# Patient Record
Sex: MALE | Race: WHITE | NOT HISPANIC OR LATINO | Employment: OTHER | URBAN - METROPOLITAN AREA
[De-identification: names, ages, dates, MRNs, and addresses within clinical notes are randomized per-mention and may not be internally consistent; named-entity substitution may affect disease eponyms.]

---

## 2024-08-19 ENCOUNTER — SPECIALTY PHARMACY (OUTPATIENT)
Age: 69
End: 2024-08-19
Payer: MEDICARE

## 2024-08-19 PROBLEM — L40.50 PSORIATIC ARTHRITIS: Status: ACTIVE | Noted: 2024-08-19

## 2024-08-20 PROBLEM — Z79.899 IMMUNOSUPPRESSION DUE TO DRUG THERAPY: Status: ACTIVE | Noted: 2024-08-20

## 2024-08-20 PROBLEM — L40.9 PSORIASIS: Status: ACTIVE | Noted: 2024-08-20

## 2024-08-20 PROBLEM — T78.1XXA ALLERGIC REACTION TO ALPHA-GAL: Status: ACTIVE | Noted: 2024-08-20

## 2024-08-20 PROBLEM — D84.821 IMMUNOSUPPRESSION DUE TO DRUG THERAPY: Status: ACTIVE | Noted: 2024-08-20

## 2024-08-20 PROBLEM — M19.93 SECONDARY OSTEOARTHRITIS: Status: ACTIVE | Noted: 2024-08-20

## 2024-08-20 NOTE — ASSESSMENT & PLAN NOTE
ONSET 1991. MTX CAUSED NAUSEA. ARAVA [id number] (INEFFECTIVE). ENBREL 2002..    He continues to do well.   No significant pain or swelling.   Tender joint count is 0 , swollen joint count is 0 , physician global is 1, visual analog pain scale is 4, pt global is 3.  CDAI is 4-low disease activity. Prognosis is good.    Continue Enbrel and q 4 month f/u and lab.

## 2024-08-20 NOTE — PROGRESS NOTES
Office Follow Up      Date: 08/21/2024   Patient Name: Ashutosh Servin  MRN: [id number]  YOB: 1955    Referring Physician: No ref. provider found     Chief Complaint: Psoriatic arthritis      History of Present Illness: Ashutosh Servin is a 69 y.o. male who is here today for follow up on psoriatic arthritis and gout.  He has done well.  No significant pain or swelling.   No gout flares in the interim.    PsA is doing well. No infections or problems with injections.   Had acupuncture and no longer having alpha gal reactions   Pain scale: 4/10. He has no EMS. The problem has not changed. The following symptoms are not reported:  pain, stiffness, swelling and functional limitation. The patient assesses the interval disease activity as: stable. The patient's assessment of treatment is: helping greatly. The patient is not experiencing side effects. Pertinent negatives include fever, infection, fatigue, AM stiffness, inactivity gelling, change in vision, sicca complaints, mouth sores/lesions, skin lesion(s), chest pain, changing cough, edema, joint swelling and abdominal pain.       Subjective   Review of Systems: Review of Systems   Constitutional:  Negative for chills, fatigue, fever and unexpected weight loss.   HENT:  Positive for tinnitus. Negative for dental problem, mouth sores, sinus pressure and swollen glands.    Eyes:  Negative for photophobia, pain and redness.   Respiratory:  Negative for apnea, cough, chest tightness and shortness of breath.    Cardiovascular:  Negative for chest pain and leg swelling.   Gastrointestinal:  Positive for GERD. Negative for abdominal pain, diarrhea and nausea.   Genitourinary:  Negative for dysuria and hematuria.   Skin:  Negative for dry skin, rash, skin lesions and bruise.   Neurological:  Negative for dizziness, seizures, syncope, weakness, headache and memory problem.   Hematological:  Negative for adenopathy. Does not bruise/bleed easily.  "  Psychiatric/Behavioral:  Negative for sleep disturbance, suicidal ideas, depressed mood and stress. The patient is not nervous/anxious.    All other systems reviewed and are negative.       Medications:   Current Outpatient Medications:     etanercept (Enbrel) 50 MG/ML solution prefilled syringe injection, Inject 1 mL under the skin into the appropriate area as directed 1 (One) Time Per Week. INJECT 50 ML WEEKLY AS DIRECTED, Disp: 4 each, Rfl: 4    mometasone (ELOCON) 0.1 % ointment, Apply 1 Application topically to the appropriate area as directed Daily., Disp: , Rfl:     multivitamin (MULTIVITAMIN PO), Take 1 tablet by mouth Daily., Disp: , Rfl:     Allergies:   Allergies   Allergen Reactions    Penicillins Provider Review Needed       I have reviewed and updated the patient's chief complaint, history of present illness, review of systems, past medical history, surgical history, family history, social history, medications and allergy list as appropriate.     Objective    Vital Signs:   Vitals:    08/21/24 0920   BP: 116/82   Pulse: 58   Temp: 98.2 °F (36.8 °C)   Weight: 105 kg (231 lb 14.4 oz)   Height: 182.9 cm (72\")   PainSc:   4   PainLoc: Hand  Comment: Bilateral/feet     Body mass index is 31.45 kg/m².    Physical Exam:  GENERAL: The patient is well developed and well nourished.  Cooperative and oriented times three.  Affect is normal.  Hydration appears normal.    HEENT: Normocephalic and atraumatic.  No notable alopecia.  No facial rash.  Lids and conjunctiva are normal.  Pupils are equal and sclera are clear.  I see no oral or nasal ulcers.  Lips, teeth, and gums are within normal limits.  Oropharynx is clear.    NECK: Neck is supple without adenopathy, masses, or thyromegaly.    LUNGS: Effort is normal.  Lungs are clear without rales or rhonchi.    CARDIOVASCULAR: Normal S1, S2.  No murmurs are heard.    ABDOMEN: There are no masses.  The abdomen is non-tender.  I feel no hepatosplenomegaly.  "   EXTREMITIES: There is no edema.    I see no cyanosis or clubbing.    SKIN: Inspection and palpation are normal.  No rashes are present. Moderate psoriatic lesions on elbows.  NEUROLOGIC: Gait is normal.  MUSCULOSKELETAL: Complete joint exam was performed.  There is full range of motion of the shoulders, elbows, and wrists without notable deformities, soft tissue swelling, synovitis, or atrophy. Severe, nearly mutilans deformities of fingers. Tender on lateral epicondyle. Hips have good flexion and internal and external rotation.  Knees have no palpable effusions.  There is full extension and full flexion of the knees without pain.  Ankles have no soft tissue swelling, synovitis, or major deformities.    BACK: Straight without notable scoliosis.  Joint Exam 08/21/2024     The following joints were examined and normal:   Left Glenohumeral, Right Glenohumeral, Left Elbow, Right Elbow, Left Wrist, Right Wrist, Left MCP 1, Right MCP 1, Left MCP 2, Right MCP 2, Left MCP 3, Right MCP 3, Left MCP 4, Right MCP 4, Left MCP 5, Right MCP 5, Left IP (thumb), Right IP (thumb), Left PIP 2 (finger), Right PIP 2 (finger), Left PIP 3 (finger), Right PIP 3 (finger), Left PIP 4 (finger), Right PIP 4 (finger), Left PIP 5 (finger), Right PIP 5 (finger), Left Knee, Right Knee       Patient Global: 30 / 100  Provider Global: 0 / 100    Assessment / Plan      Assessment & Plan  Psoriatic arthritis  ONSET 1991. MTX CAUSED NAUSEA. ARAVA [phone] (INEFFECTIVE). ENBREL 2002..    He continues to do well.   No significant pain or swelling.   Tender joint count is 0 , swollen joint count is 0 , physician global is 1, visual analog pain scale is 4, pt global is 3.  CDAI is 4-low disease activity. Prognosis is good.    Continue Enbrel and q 4 month f/u and lab.  Psoriasis  Stable with minimal lesions.  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  Allergic reaction to alpha-gal  Better since  acupuncture treatment.   Immunosuppression due to drug therapy  Enbrel.  No infections in the interim.  Idiopathic chronic gout of multiple sites without tophus  He declines treatment.  He has done well with diet change. No flares in the interim.     Orders Placed This Encounter   Procedures    Comprehensive Metabolic Panel    CBC Auto Differential    C-reactive Protein    Sedimentation Rate    Uric Acid     New Medications Ordered This Visit   Medications    etanercept (Enbrel) 50 MG/ML solution prefilled syringe injection     Sig: Inject 1 mL under the skin into the appropriate area as directed 1 (One) Time Per Week. INJECT 50 ML WEEKLY AS DIRECTED     Dispense:  4 each     Refill:  4          Follow Up:   Return in about 4 months (around 12/21/2024).        Jeffrey William MD  [facility] [place]

## 2024-08-21 ENCOUNTER — OFFICE VISIT (OUTPATIENT)
Age: 69
End: 2024-08-21
Payer: MEDICARE

## 2024-08-21 ENCOUNTER — LAB (OUTPATIENT)
Facility: HOSPITAL | Age: 69
End: 2024-08-21
Payer: MEDICARE

## 2024-08-21 VITALS
HEIGHT: 72 IN | BODY MASS INDEX: 31.41 KG/M2 | SYSTOLIC BLOOD PRESSURE: 116 MMHG | TEMPERATURE: 98.2 F | WEIGHT: 231.9 LBS | DIASTOLIC BLOOD PRESSURE: 82 MMHG | HEART RATE: 58 BPM

## 2024-08-21 DIAGNOSIS — D84.821 IMMUNOSUPPRESSION DUE TO DRUG THERAPY: ICD-10-CM

## 2024-08-21 DIAGNOSIS — Z79.899 IMMUNOSUPPRESSION DUE TO DRUG THERAPY: ICD-10-CM

## 2024-08-21 DIAGNOSIS — T78.1XXA ALLERGIC REACTION TO ALPHA-GAL: ICD-10-CM

## 2024-08-21 DIAGNOSIS — L40.9 PSORIASIS: ICD-10-CM

## 2024-08-21 DIAGNOSIS — M1A.09X0 IDIOPATHIC CHRONIC GOUT OF MULTIPLE SITES WITHOUT TOPHUS: ICD-10-CM

## 2024-08-21 DIAGNOSIS — L40.50 PSORIATIC ARTHRITIS: Primary | ICD-10-CM

## 2024-08-21 DIAGNOSIS — L40.50 PSORIATIC ARTHRITIS: ICD-10-CM

## 2024-08-21 DIAGNOSIS — M19.93 SECONDARY OSTEOARTHRITIS: ICD-10-CM

## 2024-08-21 LAB
ALBUMIN SERPL-MCNC: 4.5 G/DL (ref 3.5–5.2)
ALBUMIN/GLOB SERPL: 1.4 G/DL
ALP SERPL-CCNC: 67 U/L (ref 39–117)
ALT SERPL W P-5'-P-CCNC: 37 U/L (ref 1–41)
ANION GAP SERPL CALCULATED.3IONS-SCNC: 12.1 MMOL/L (ref 5–15)
AST SERPL-CCNC: 41 U/L (ref 1–40)
BILIRUB SERPL-MCNC: 1 MG/DL (ref 0–1.2)
BUN SERPL-MCNC: 12 MG/DL (ref 8–23)
BUN/CREAT SERPL: 7.6 (ref 7–25)
CALCIUM SPEC-SCNC: 10 MG/DL (ref 8.6–10.5)
CHLORIDE SERPL-SCNC: 104 MMOL/L (ref 98–107)
CO2 SERPL-SCNC: 26.9 MMOL/L (ref 22–29)
CREAT SERPL-MCNC: 1.58 MG/DL (ref 0.76–1.27)
CRP SERPL-MCNC: <0.3 MG/DL (ref 0–0.5)
EGFRCR SERPLBLD CKD-EPI 2021: 47.4 ML/MIN/1.73
GLOBULIN UR ELPH-MCNC: 3.2 GM/DL
GLUCOSE SERPL-MCNC: 82 MG/DL (ref 65–99)
POTASSIUM SERPL-SCNC: 4.6 MMOL/L (ref 3.5–5.2)
PROT SERPL-MCNC: 7.7 G/DL (ref 6–8.5)
SODIUM SERPL-SCNC: 143 MMOL/L (ref 136–145)
URATE SERPL-MCNC: 7.1 MG/DL (ref 3.4–7)

## 2024-08-21 PROCEDURE — 85652 RBC SED RATE AUTOMATED: CPT

## 2024-08-21 PROCEDURE — 1160F RVW MEDS BY RX/DR IN RCRD: CPT | Performed by: INTERNAL MEDICINE

## 2024-08-21 PROCEDURE — 99214 OFFICE O/P EST MOD 30 MIN: CPT | Performed by: INTERNAL MEDICINE

## 2024-08-21 PROCEDURE — 84550 ASSAY OF BLOOD/URIC ACID: CPT

## 2024-08-21 PROCEDURE — 1159F MED LIST DOCD IN RCRD: CPT | Performed by: INTERNAL MEDICINE

## 2024-08-21 PROCEDURE — 86140 C-REACTIVE PROTEIN: CPT

## 2024-08-21 PROCEDURE — 85025 COMPLETE CBC W/AUTO DIFF WBC: CPT

## 2024-08-21 PROCEDURE — 80053 COMPREHEN METABOLIC PANEL: CPT

## 2024-08-21 PROCEDURE — 36415 COLL VENOUS BLD VENIPUNCTURE: CPT

## 2024-08-21 RX ORDER — ETANERCEPT 50 MG/ML
50 SOLUTION SUBCUTANEOUS WEEKLY
Qty: 4 EACH | Refills: 4 | Status: SHIPPED | OUTPATIENT
Start: 2024-08-21

## 2024-08-22 LAB
BASOPHILS # BLD AUTO: 0.05 10*3/MM3 (ref 0–0.2)
BASOPHILS NFR BLD AUTO: 0.6 % (ref 0–1.5)
DEPRECATED RDW RBC AUTO: 42.3 FL (ref 37–54)
EOSINOPHIL # BLD AUTO: 0.16 10*3/MM3 (ref 0–0.4)
EOSINOPHIL NFR BLD AUTO: 2 % (ref 0.3–6.2)
ERYTHROCYTE [DISTWIDTH] IN BLOOD BY AUTOMATED COUNT: 12.5 % (ref 12.3–15.4)
ERYTHROCYTE [SEDIMENTATION RATE] IN BLOOD: 9 MM/HR (ref 0–20)
HCT VFR BLD AUTO: 54 % (ref 37.5–51)
HGB BLD-MCNC: 18.5 G/DL (ref 13–17.7)
IMM GRANULOCYTES # BLD AUTO: 0.02 10*3/MM3 (ref 0–0.05)
IMM GRANULOCYTES NFR BLD AUTO: 0.3 % (ref 0–0.5)
LYMPHOCYTES # BLD AUTO: 2.41 10*3/MM3 (ref 0.7–3.1)
LYMPHOCYTES NFR BLD AUTO: 30.1 % (ref 19.6–45.3)
MCH RBC QN AUTO: 32 PG (ref 26.6–33)
MCHC RBC AUTO-ENTMCNC: 34.3 G/DL (ref 31.5–35.7)
MCV RBC AUTO: 93.3 FL (ref 79–97)
MONOCYTES # BLD AUTO: 0.66 10*3/MM3 (ref 0.1–0.9)
MONOCYTES NFR BLD AUTO: 8.3 % (ref 5–12)
NEUTROPHILS NFR BLD AUTO: 4.7 10*3/MM3 (ref 1.7–7)
NEUTROPHILS NFR BLD AUTO: 58.7 % (ref 42.7–76)
NRBC BLD AUTO-RTO: 0 /100 WBC (ref 0–0.2)
PLATELET # BLD AUTO: 238 10*3/MM3 (ref 140–450)
PMV BLD AUTO: 10.5 FL (ref 6–12)
RBC # BLD AUTO: 5.79 10*6/MM3 (ref 4.14–5.8)
WBC NRBC COR # BLD AUTO: 8 10*3/MM3 (ref 3.4–10.8)

## 2024-08-27 ENCOUNTER — SPECIALTY PHARMACY (OUTPATIENT)
Dept: GENERAL RADIOLOGY | Facility: HOSPITAL | Age: 69
End: 2024-08-27
Payer: MEDICARE

## 2024-08-27 RX ORDER — ETANERCEPT 50 MG/ML
50 SOLUTION SUBCUTANEOUS
Qty: 4 EACH | Refills: 4 | Status: SHIPPED | OUTPATIENT
Start: 2024-08-27

## 2024-08-27 NOTE — PROGRESS NOTES
Specialty Pharmacy Patient Management Program  Rheumatology Initial Assessment     Ashutosh Servin was referred by an Rheumatology provider to the Rheumatology Patient Management program offered by [facility] for Psoriatic Arthritis on 08/27/24.  An initial outreach was conducted, including assessment of therapy appropriateness and specialty medication education for Enbrel. The patient was introduced to services offered by [facility], including: regular assessments, refill coordination, curbside pick-up or mail order delivery options, prior authorization maintenance, and financial assistance programs as applicable. The patient was also provided with contact information for the pharmacy team.     Insurance Coverage & Financial Support  [employer] Medicare D      Relevant Past Medical History and Comorbidities  Relevant medical history and concomitant health conditions were discussed with the patient. The patient's chart has been reviewed for relevant past medical history and comorbid conditions and updated as necessary.  Past Medical History:   Diagnosis Date    Bronchitis     Epicondylitis, lateral     LEFT    High risk medication use     Immunosuppression     Nephrolithiasis     Odynophagia     Psoriasis     Psoriatic arthritis     Secondary osteoarthritis      Social History     Socioeconomic History    Marital status:    Tobacco Use    Smoking status: Never     Passive exposure: Never    Smokeless tobacco: Never   Vaping Use    Vaping status: Never Used   Substance and Sexual Activity    Alcohol use: Not Currently    Drug use: Never    Sexual activity: Defer       Problem list reviewed by Obdulio Del Cid, PharmD on 8/27/2024 at 10:09 AM    Allergies  Known allergies and reactions were discussed with the patient. The patient's chart has been reviewed for  allergy information and updated as necessary.   Allergies   Allergen Reactions    Penicillins Provider  "Review Needed       Allergies reviewed by Obdulio Del Cid PharmD on 8/27/2024 at 10:08 AM    Relevant Laboratory Values  Relevant laboratory values were discussed with the patient. The following specialty medication dose adjustment(s) are recommended: n/a    No results found for: \"HGBA1C\"  Lab Results   Component Value Date    GLUCOSE 82 08/21/2024    CALCIUM 10.0 08/21/2024     08/21/2024    K 4.6 08/21/2024    CO2 26.9 08/21/2024     08/21/2024    BUN 12 08/21/2024    CREATININE 1.58 (H) 08/21/2024    BCR 7.6 08/21/2024    ANIONGAP 12.1 08/21/2024     No results found for: \"CHOL\", \"CHLPL\", \"TRIG\", \"HDL\", \"LDL\", \"LDLDIRECT\"      Current Medication List  This medication list has been reviewed with the patient and evaluated for any interactions or necessary modifications/recommendations, and updated to include all prescription medications, OTC medications, and supplements the patient is currently taking.  This list reflects what is contained in the patient's profile, which has also been marked as reviewed to communicate to other providers it is the most up to date version of the patient's current medication therapy.     Current Outpatient Medications:     etanercept (Enbrel) 50 MG/ML solution prefilled syringe injection, Inject 1 mL under the skin into the appropriate area as directed Every 7 (Seven) Days., Disp: 4 each, Rfl: 4    mometasone (ELOCON) 0.1 % ointment, Apply 1 Application topically to the appropriate area as directed Daily., Disp: , Rfl:     multivitamin (MULTIVITAMIN PO), Take 1 tablet by mouth Daily., Disp: , Rfl:     Medicines reviewed by Obdulio Del Cid, [place] on 8/27/2024 at 10:08 AM    Drug Interactions  N/a    Initial Education Provided for Specialty Medication  The patient has been provided with the following education and any applicable administration techniques (i.e. self-injection) have been demonstrated for the therapies indicated. All questions and concerns " have been addressed prior to the patient receiving the medication, and the patient has verbalized comprehension of the education and any materials provided. Additional patient education shall be provided and documented upon request by the patient, provider, or payer.          Enbrel (etanercept)           Medication Expectations   Why am I taking this medication? You are taking this medication for plaque psoriasis, psoriatic arthritis, or rheumatoid arthritis.   What should I expect while on this medication? You should expect a decrease in the frequency and severity of symptoms.   How does the medication work? Entanercept binds to tumor necrosis factor and blocks its interaction with cell surface receptors.   How long will I be on this medication for? The amount of time you will be on this medication will be determined by your doctor and your response to the medication.    How do I take this medication? Take as directed on your prescription label.  This medication is a subcutaneous injection given in the fatty part of the skin on the top of the thigh, upper outer arm, or stomach area. May allow to reach room temperature for 15 - 30 minutes prior to injection.   What are some possible side effects? Injection site reactions and hypersensitivity reactions, skin rash, diarrhea, signs of a common cold, upper respiratory tract infection, or itching.   What happens if I miss a dose? If you miss a dose, take it as soon as you remember. If it is close to the time for your next dose, skip the missed dose and go back to your normal time.                    Medication Safety   What are things I should warn my doctor immediately about? Allergic reaction such has hives or trouble breathing. If you develop symptoms of infection such as a cough that does not go away, weight loss, night sweats, sking or mole changes, muscle pain or weakness, or severe dizziness.     What are things that I should be cautious of? Injection site  reaction, skin rash, and diarrhea. You may have more chance of getting an infection.  Wash your hands often and stay away from people with infections, colds, or flu.   What are some medications that can interact with this one? Immunosuppressants and vaccines.            Medication Storage/Handling   How should I handle this medication? Keep this medication our of reach of pets/children in original container.  Store in the original container to protect from light. Do not freeze or shake. Do not inject where the skin is tender, bruised, red, hard, or affected by psoriasis.  Rotate injection sites.   How does this medication need to be stored? Store in refrigerator and keep dry. If needed, you may store at room temperature for up to 14 days.    How should I dispose of this medication? You can dispose of the syringe in a sharps container, and if this is not available you may use an empty hard plastic container such as a milk jug or tide container. Discard any unused portion or if stored outside of refrigerator > 14 days.            Resources/Support   How can I remind myself to take this medication? You can download a reminder duke on your phone or use a calandar  to help with your injection.   Is financial support available?  Yes, [employer] can provide co-pay assisstance if you have commercial insurance or patient assistance if you have Medicare or no insurance.    Which vaccines are recommended for me? Talk to your doctor about these vaccines: Flu, Coronavirus (COVID-19), Pneumococcal (pneumonia), Tdap, Hepatitis B, Zoster (shingles).              Adherence and Self-Administration  Adherence related to the patient's specialty therapy was discussed with the patient. The Adherence segment of this outreach has been reviewed and updated.     Is there a concern with patient's ability to self administer the medication correctly and without issue?: No  Were any potential barriers to adherence identified during the  initial assessment or patient education?: No  Are there any concerns regarding the patient's understanding of the importance of medication adherence?: No  Methods for Supporting Patient Adherence and/or Self-Administration: n/a    Open Medication Therapy Problems  No medication therapy recommendations to display    Goals of Therapy  Goals related to the patient's specialty therapy were discussed with the patient. The Patient Goals segment of this outreach has been reviewed and updated.   Goals Addressed Today    None       Reassessment Plan & Follow-Up  1. Medication Therapy Changes: Enbrel 50mg/ml Prefilled syringes subq every 7 days  2. Related Plans, Therapy Recommendations, or Therapy Problems to Be Addressed: Patient been on therapy for 22 years.  Patient did not have any questions about medication or administration.  Patient prefers prefilled syringes.  Advised patient to call direct line with any further questions.  Advised patient medication would be delivered on Thursday.   3. Pharmacist to perform regular assessments no more than (6) months from the previous assessment.  4. Care Coordinator to set up future refill outreaches, coordinate prescription delivery, and escalate clinical questions to pharmacist.  5. Welcome information and patient satisfaction survey to be sent by specialty pharmacy team with patient's initial fill.    Attestation  Therapeutic appropriateness: Appropriate   I attest the patient was actively involved in and has agreed to the above plan of care. If the prescribed therapy is at any point deemed not appropriate based on the current or future assessments, a consultation will be initiated with the patient's specialty care provider to determine the best course of action. The revised plan of therapy will be documented along with any required assessments and/or additional patient education provided.     Obdulio Del Cid, PharmD  Clinical Specialty Pharmacist,  Rheumatology  8/27/2024  10:11 EDT

## 2024-09-04 ENCOUNTER — APPOINTMENT (OUTPATIENT)
Dept: CT IMAGING | Facility: HOSPITAL | Age: 69
DRG: 309 | End: 2024-09-04
Payer: MEDICARE

## 2024-09-04 ENCOUNTER — HOSPITAL ENCOUNTER (INPATIENT)
Facility: HOSPITAL | Age: 69
LOS: 1 days | Discharge: HOME | DRG: 309 | End: 2024-09-05
Attending: STUDENT IN AN ORGANIZED HEALTH CARE EDUCATION/TRAINING PROGRAM | Admitting: STUDENT IN AN ORGANIZED HEALTH CARE EDUCATION/TRAINING PROGRAM
Payer: MEDICARE

## 2024-09-04 ENCOUNTER — APPOINTMENT (OUTPATIENT)
Dept: GENERAL RADIOLOGY | Facility: HOSPITAL | Age: 69
DRG: 309 | End: 2024-09-04
Payer: MEDICARE

## 2024-09-04 DIAGNOSIS — I48.91 ATRIAL FIBRILLATION WITH RAPID VENTRICULAR RESPONSE: Primary | ICD-10-CM

## 2024-09-04 LAB
ALBUMIN SERPL-MCNC: 4.4 G/DL (ref 3.5–5.2)
ALBUMIN/GLOB SERPL: 1.3 G/DL
ALP SERPL-CCNC: 74 U/L (ref 39–117)
ALT SERPL W P-5'-P-CCNC: 51 U/L (ref 1–41)
ANION GAP SERPL CALCULATED.3IONS-SCNC: 13.9 MMOL/L (ref 5–15)
AST SERPL-CCNC: 35 U/L (ref 1–40)
BASOPHILS # BLD AUTO: 0.04 10*3/MM3 (ref 0–0.2)
BASOPHILS NFR BLD AUTO: 0.4 % (ref 0–1.5)
BILIRUB SERPL-MCNC: 1.2 MG/DL (ref 0–1.2)
BUN SERPL-MCNC: 20 MG/DL (ref 8–23)
BUN/CREAT SERPL: 14.7 (ref 7–25)
CALCIUM SPEC-SCNC: 8.9 MG/DL (ref 8.6–10.5)
CHLORIDE SERPL-SCNC: 98 MMOL/L (ref 98–107)
CHOLEST SERPL-MCNC: 123 MG/DL (ref 0–200)
CK SERPL-CCNC: 251 U/L (ref 20–200)
CO2 SERPL-SCNC: 26.1 MMOL/L (ref 22–29)
CREAT SERPL-MCNC: 1.36 MG/DL (ref 0.76–1.27)
DEPRECATED RDW RBC AUTO: 42.9 FL (ref 37–54)
EGFRCR SERPLBLD CKD-EPI 2021: 56.7 ML/MIN/1.73
EOSINOPHIL # BLD AUTO: 0.03 10*3/MM3 (ref 0–0.4)
EOSINOPHIL NFR BLD AUTO: 0.3 % (ref 0.3–6.2)
ERYTHROCYTE [DISTWIDTH] IN BLOOD BY AUTOMATED COUNT: 12.6 % (ref 12.3–15.4)
GEN 5 2HR TROPONIN T REFLEX: 14 NG/L
GLOBULIN UR ELPH-MCNC: 3.3 GM/DL
GLUCOSE BLDC GLUCOMTR-MCNC: 83 MG/DL (ref 70–130)
GLUCOSE SERPL-MCNC: 82 MG/DL (ref 65–99)
HBA1C MFR BLD: 5.5 % (ref 4.8–5.6)
HCT VFR BLD AUTO: 54.3 % (ref 37.5–51)
HDLC SERPL-MCNC: 37 MG/DL (ref 40–60)
HGB BLD-MCNC: 18.9 G/DL (ref 13–17.7)
IMM GRANULOCYTES # BLD AUTO: 0.07 10*3/MM3 (ref 0–0.05)
IMM GRANULOCYTES NFR BLD AUTO: 0.6 % (ref 0–0.5)
INR PPP: 1.09 (ref 0.9–1.1)
LDLC SERPL CALC-MCNC: 64 MG/DL (ref 0–100)
LDLC/HDLC SERPL: 1.65 {RATIO}
LYMPHOCYTES # BLD AUTO: 3.13 10*3/MM3 (ref 0.7–3.1)
LYMPHOCYTES NFR BLD AUTO: 28.4 % (ref 19.6–45.3)
MAGNESIUM SERPL-MCNC: 2.7 MG/DL (ref 1.6–2.4)
MCH RBC QN AUTO: 32.6 PG (ref 26.6–33)
MCHC RBC AUTO-ENTMCNC: 34.8 G/DL (ref 31.5–35.7)
MCV RBC AUTO: 93.6 FL (ref 79–97)
MONOCYTES # BLD AUTO: 0.77 10*3/MM3 (ref 0.1–0.9)
MONOCYTES NFR BLD AUTO: 7 % (ref 5–12)
NEUTROPHILS NFR BLD AUTO: 6.98 10*3/MM3 (ref 1.7–7)
NEUTROPHILS NFR BLD AUTO: 63.3 % (ref 42.7–76)
NRBC BLD AUTO-RTO: 0 /100 WBC (ref 0–0.2)
PLATELET # BLD AUTO: 264 10*3/MM3 (ref 140–450)
PMV BLD AUTO: 9.5 FL (ref 6–12)
POTASSIUM SERPL-SCNC: 3.4 MMOL/L (ref 3.5–5.2)
PROT SERPL-MCNC: 7.7 G/DL (ref 6–8.5)
PROTHROMBIN TIME: 14.2 SECONDS (ref 12.1–14.7)
RBC # BLD AUTO: 5.8 10*6/MM3 (ref 4.14–5.8)
SODIUM SERPL-SCNC: 138 MMOL/L (ref 136–145)
TRIGL SERPL-MCNC: 124 MG/DL (ref 0–150)
TROPONIN T DELTA: -1 NG/L
TROPONIN T SERPL HS-MCNC: 15 NG/L
TSH SERPL DL<=0.05 MIU/L-ACNC: 6.22 UIU/ML (ref 0.27–4.2)
VLDLC SERPL-MCNC: 22 MG/DL (ref 5–40)
WBC NRBC COR # BLD AUTO: 11.02 10*3/MM3 (ref 3.4–10.8)

## 2024-09-04 PROCEDURE — 71275 CT ANGIOGRAPHY CHEST: CPT

## 2024-09-04 PROCEDURE — 36415 COLL VENOUS BLD VENIPUNCTURE: CPT

## 2024-09-04 PROCEDURE — 25810000003 LACTATED RINGERS SOLUTION: Performed by: STUDENT IN AN ORGANIZED HEALTH CARE EDUCATION/TRAINING PROGRAM

## 2024-09-04 PROCEDURE — 83036 HEMOGLOBIN GLYCOSYLATED A1C: CPT | Performed by: STUDENT IN AN ORGANIZED HEALTH CARE EDUCATION/TRAINING PROGRAM

## 2024-09-04 PROCEDURE — 84484 ASSAY OF TROPONIN QUANT: CPT | Performed by: STUDENT IN AN ORGANIZED HEALTH CARE EDUCATION/TRAINING PROGRAM

## 2024-09-04 PROCEDURE — 71275 CT ANGIOGRAPHY CHEST: CPT | Performed by: RADIOLOGY

## 2024-09-04 PROCEDURE — 83735 ASSAY OF MAGNESIUM: CPT | Performed by: STUDENT IN AN ORGANIZED HEALTH CARE EDUCATION/TRAINING PROGRAM

## 2024-09-04 PROCEDURE — 82550 ASSAY OF CK (CPK): CPT | Performed by: STUDENT IN AN ORGANIZED HEALTH CARE EDUCATION/TRAINING PROGRAM

## 2024-09-04 PROCEDURE — 85025 COMPLETE CBC W/AUTO DIFF WBC: CPT | Performed by: STUDENT IN AN ORGANIZED HEALTH CARE EDUCATION/TRAINING PROGRAM

## 2024-09-04 PROCEDURE — 74177 CT ABD & PELVIS W/CONTRAST: CPT

## 2024-09-04 PROCEDURE — 80053 COMPREHEN METABOLIC PANEL: CPT | Performed by: STUDENT IN AN ORGANIZED HEALTH CARE EDUCATION/TRAINING PROGRAM

## 2024-09-04 PROCEDURE — 25810000003 SODIUM CHLORIDE 0.9 % SOLUTION: Performed by: STUDENT IN AN ORGANIZED HEALTH CARE EDUCATION/TRAINING PROGRAM

## 2024-09-04 PROCEDURE — 74177 CT ABD & PELVIS W/CONTRAST: CPT | Performed by: RADIOLOGY

## 2024-09-04 PROCEDURE — 93005 ELECTROCARDIOGRAM TRACING: CPT | Performed by: STUDENT IN AN ORGANIZED HEALTH CARE EDUCATION/TRAINING PROGRAM

## 2024-09-04 PROCEDURE — 71045 X-RAY EXAM CHEST 1 VIEW: CPT

## 2024-09-04 PROCEDURE — 84443 ASSAY THYROID STIM HORMONE: CPT | Performed by: STUDENT IN AN ORGANIZED HEALTH CARE EDUCATION/TRAINING PROGRAM

## 2024-09-04 PROCEDURE — 25010000002 DIGOXIN PER 500 MCG: Performed by: STUDENT IN AN ORGANIZED HEALTH CARE EDUCATION/TRAINING PROGRAM

## 2024-09-04 PROCEDURE — 99285 EMERGENCY DEPT VISIT HI MDM: CPT

## 2024-09-04 PROCEDURE — 85610 PROTHROMBIN TIME: CPT | Performed by: STUDENT IN AN ORGANIZED HEALTH CARE EDUCATION/TRAINING PROGRAM

## 2024-09-04 PROCEDURE — 80061 LIPID PANEL: CPT | Performed by: STUDENT IN AN ORGANIZED HEALTH CARE EDUCATION/TRAINING PROGRAM

## 2024-09-04 PROCEDURE — 82948 REAGENT STRIP/BLOOD GLUCOSE: CPT

## 2024-09-04 PROCEDURE — 71045 X-RAY EXAM CHEST 1 VIEW: CPT | Performed by: RADIOLOGY

## 2024-09-04 PROCEDURE — 25510000001 IOPAMIDOL PER 1 ML: Performed by: STUDENT IN AN ORGANIZED HEALTH CARE EDUCATION/TRAINING PROGRAM

## 2024-09-04 PROCEDURE — 99223 1ST HOSP IP/OBS HIGH 75: CPT | Performed by: STUDENT IN AN ORGANIZED HEALTH CARE EDUCATION/TRAINING PROGRAM

## 2024-09-04 RX ORDER — METOPROLOL TARTRATE 1 MG/ML
5 INJECTION, SOLUTION INTRAVENOUS ONCE
Status: COMPLETED | OUTPATIENT
Start: 2024-09-04 | End: 2024-09-04

## 2024-09-04 RX ORDER — ONDANSETRON 2 MG/ML
4 INJECTION INTRAMUSCULAR; INTRAVENOUS EVERY 6 HOURS PRN
Status: DISCONTINUED | OUTPATIENT
Start: 2024-09-04 | End: 2024-09-05 | Stop reason: HOSPADM

## 2024-09-04 RX ORDER — NITROGLYCERIN 0.4 MG/1
0.4 TABLET SUBLINGUAL
Status: DISCONTINUED | OUTPATIENT
Start: 2024-09-04 | End: 2024-09-04 | Stop reason: SDUPTHER

## 2024-09-04 RX ORDER — NITROGLYCERIN 0.4 MG/1
0.4 TABLET SUBLINGUAL
Status: DISCONTINUED | OUTPATIENT
Start: 2024-09-04 | End: 2024-09-05 | Stop reason: HOSPADM

## 2024-09-04 RX ORDER — DIGOXIN 0.25 MG/ML
250 INJECTION INTRAMUSCULAR; INTRAVENOUS ONCE
Status: COMPLETED | OUTPATIENT
Start: 2024-09-04 | End: 2024-09-04

## 2024-09-04 RX ORDER — SODIUM CHLORIDE 9 MG/ML
40 INJECTION, SOLUTION INTRAVENOUS AS NEEDED
Status: DISCONTINUED | OUTPATIENT
Start: 2024-09-04 | End: 2024-09-05 | Stop reason: HOSPADM

## 2024-09-04 RX ORDER — BISACODYL 5 MG/1
5 TABLET, DELAYED RELEASE ORAL DAILY PRN
Status: DISCONTINUED | OUTPATIENT
Start: 2024-09-04 | End: 2024-09-05 | Stop reason: HOSPADM

## 2024-09-04 RX ORDER — PREDNISONE 10 MG/1
10 TABLET ORAL 2 TIMES DAILY
COMMUNITY

## 2024-09-04 RX ORDER — ASPIRIN 81 MG/1
81 TABLET ORAL DAILY
Status: DISCONTINUED | OUTPATIENT
Start: 2024-09-05 | End: 2024-09-05 | Stop reason: HOSPADM

## 2024-09-04 RX ORDER — SODIUM CHLORIDE 0.9 % (FLUSH) 0.9 %
10 SYRINGE (ML) INJECTION EVERY 12 HOURS SCHEDULED
Status: DISCONTINUED | OUTPATIENT
Start: 2024-09-04 | End: 2024-09-05 | Stop reason: HOSPADM

## 2024-09-04 RX ORDER — SODIUM CHLORIDE 0.9 % (FLUSH) 0.9 %
10 SYRINGE (ML) INJECTION AS NEEDED
Status: DISCONTINUED | OUTPATIENT
Start: 2024-09-04 | End: 2024-09-05 | Stop reason: HOSPADM

## 2024-09-04 RX ORDER — PANTOPRAZOLE SODIUM 40 MG/1
40 TABLET, DELAYED RELEASE ORAL
Status: DISCONTINUED | OUTPATIENT
Start: 2024-09-05 | End: 2024-09-05 | Stop reason: HOSPADM

## 2024-09-04 RX ORDER — POLYETHYLENE GLYCOL 3350 17 G/17G
17 POWDER, FOR SOLUTION ORAL DAILY PRN
Status: DISCONTINUED | OUTPATIENT
Start: 2024-09-04 | End: 2024-09-05 | Stop reason: HOSPADM

## 2024-09-04 RX ORDER — AMOXICILLIN 250 MG
2 CAPSULE ORAL 2 TIMES DAILY PRN
Status: DISCONTINUED | OUTPATIENT
Start: 2024-09-04 | End: 2024-09-05 | Stop reason: HOSPADM

## 2024-09-04 RX ORDER — ROSUVASTATIN CALCIUM 20 MG/1
20 TABLET, COATED ORAL NIGHTLY
Status: DISCONTINUED | OUTPATIENT
Start: 2024-09-04 | End: 2024-09-05 | Stop reason: HOSPADM

## 2024-09-04 RX ORDER — METOPROLOL TARTRATE 25 MG/1
25 TABLET, FILM COATED ORAL EVERY 12 HOURS SCHEDULED
Status: DISCONTINUED | OUTPATIENT
Start: 2024-09-04 | End: 2024-09-05 | Stop reason: HOSPADM

## 2024-09-04 RX ORDER — LATANOPROST 50 UG/ML
1 SOLUTION/ DROPS OPHTHALMIC NIGHTLY
COMMUNITY

## 2024-09-04 RX ORDER — IOPAMIDOL 755 MG/ML
100 INJECTION, SOLUTION INTRAVASCULAR
Status: COMPLETED | OUTPATIENT
Start: 2024-09-04 | End: 2024-09-04

## 2024-09-04 RX ORDER — BISACODYL 10 MG
10 SUPPOSITORY, RECTAL RECTAL DAILY PRN
Status: DISCONTINUED | OUTPATIENT
Start: 2024-09-04 | End: 2024-09-05 | Stop reason: HOSPADM

## 2024-09-04 RX ORDER — METOPROLOL TARTRATE 25 MG/1
25 TABLET, FILM COATED ORAL ONCE
Status: COMPLETED | OUTPATIENT
Start: 2024-09-04 | End: 2024-09-04

## 2024-09-04 RX ADMIN — APIXABAN 5 MG: 5 TABLET, FILM COATED ORAL at 23:37

## 2024-09-04 RX ADMIN — ROSUVASTATIN CALCIUM 20 MG: 20 TABLET, FILM COATED ORAL at 23:37

## 2024-09-04 RX ADMIN — METOPROLOL TARTRATE 25 MG: 25 TABLET, FILM COATED ORAL at 18:42

## 2024-09-04 RX ADMIN — DIGOXIN 250 MCG: 0.25 INJECTION INTRAMUSCULAR; INTRAVENOUS at 23:36

## 2024-09-04 RX ADMIN — METOPROLOL TARTRATE 5 MG: 1 INJECTION, SOLUTION INTRAVENOUS at 17:42

## 2024-09-04 RX ADMIN — IOPAMIDOL 87 ML: 755 INJECTION, SOLUTION INTRAVENOUS at 16:55

## 2024-09-04 RX ADMIN — METOPROLOL TARTRATE 25 MG: 25 TABLET, FILM COATED ORAL at 23:37

## 2024-09-04 RX ADMIN — METOPROLOL TARTRATE 5 MG: 1 INJECTION, SOLUTION INTRAVENOUS at 17:17

## 2024-09-04 RX ADMIN — SODIUM CHLORIDE 1000 ML: 9 INJECTION, SOLUTION INTRAVENOUS at 18:42

## 2024-09-04 RX ADMIN — SODIUM CHLORIDE, POTASSIUM CHLORIDE, SODIUM LACTATE AND CALCIUM CHLORIDE 1000 ML: 600; 310; 30; 20 INJECTION, SOLUTION INTRAVENOUS at 14:59

## 2024-09-04 NOTE — ED PROVIDER NOTES
Subjective   History of Present Illness  69-year-old male past medical history of hypertension, as well as a remote history of immunosuppression presenting with complaint that he developed A-fib with RVR while he was getting an outpatient colonoscopy.  He states that last time he was in A-fib was 2001 he takes no medication to control his heart rate, he takes no blood thinners.  Reports no leg swelling no fevers no chills no belly pain no chest pain or shortness of breath.  States that he has no history of asthma or COPD and he does not know why his oxygen is on the lower side and normal  Review of Systems    Past Medical History:   Diagnosis Date    Bronchitis     Epicondylitis, lateral     LEFT    High risk medication use     Immunosuppression     Nephrolithiasis     Odynophagia     Psoriasis     Psoriatic arthritis     Secondary osteoarthritis        Allergies   Allergen Reactions    Penicillins Provider Review Needed       Past Surgical History:   Procedure Laterality Date    TONSILLECTOMY         Family History   Problem Relation Age of Onset    Arthritis Other        Social History     Socioeconomic History    Marital status:    Tobacco Use    Smoking status: Never     Passive exposure: Never    Smokeless tobacco: Never   Vaping Use    Vaping status: Never Used   Substance and Sexual Activity    Alcohol use: Not Currently    Drug use: Never    Sexual activity: Defer           Objective   Physical Exam  Constitutional:       General: He is not in acute distress.     Appearance: Normal appearance. He is not ill-appearing, toxic-appearing or diaphoretic.   HENT:      Head: Normocephalic and atraumatic.      Right Ear: Tympanic membrane normal.      Left Ear: Tympanic membrane normal.      Nose: Nose normal.      Mouth/Throat:      Mouth: Mucous membranes are moist.      Pharynx: No oropharyngeal exudate or posterior oropharyngeal erythema.   Eyes:      Extraocular Movements: Extraocular movements intact.       Conjunctiva/sclera: Conjunctivae normal.      Pupils: Pupils are equal, round, and reactive to light.   Cardiovascular:      Rate and Rhythm: Tachycardia present. Rhythm irregular.      Heart sounds: No murmur heard.     No gallop.   Pulmonary:      Effort: Pulmonary effort is normal. No respiratory distress.      Breath sounds: Normal breath sounds. No stridor. No wheezing, rhonchi or rales.   Abdominal:      General: Abdomen is flat. There is no distension.      Tenderness: There is no abdominal tenderness. There is no guarding or rebound.   Musculoskeletal:         General: No swelling, tenderness, deformity or signs of injury. Normal range of motion.      Cervical back: Normal range of motion. No rigidity or tenderness.   Skin:     General: Skin is warm and dry.      Capillary Refill: Capillary refill takes 2 to 3 seconds.      Findings: No erythema or rash.   Neurological:      General: No focal deficit present.      Mental Status: He is alert and oriented to person, place, and time. Mental status is at baseline.      Cranial Nerves: No cranial nerve deficit.      Sensory: No sensory deficit.      Motor: No weakness.   Psychiatric:         Mood and Affect: Mood normal.         Procedures           ED Course  ED Course as of 09/04/24 1955   Wed Sep 04, 2024   1443 Almost as soon as I left the room patient turned gray, he was still warm and perfused however he felt lightheaded.  Repeat EKG pending, no STEMI on the telemetry, fluids hung, pending IV [AK]   1445 And also borderline hypotensive with a MAP of 66, heart rate is now 82. [AK]      ED Course User Index  [AK] Donald Carcamo MD                                             Medical Decision Making  Patient here A-fib RVR after colonoscopy concerning for bowel perforation versus primary atrial fibrillation.  He is currently largely asymptomatic, he did not even know that he was in A-fib when he woke up.  Patient will be admitted for rate control  medicine as well as anticoagulation.    Problems Addressed:  Atrial fibrillation with rapid ventricular response: complicated acute illness or injury    Amount and/or Complexity of Data Reviewed  Labs: ordered.  Radiology: ordered.  ECG/medicine tests: ordered.    Risk  Prescription drug management.  Decision regarding hospitalization.        Final diagnoses:   Atrial fibrillation with rapid ventricular response       ED Disposition  ED Disposition       ED Disposition   Decision to Admit    Condition   --    Comment   Level of Care: Telemetry [5]   Diagnosis: Atrial fibrillation with RVR [[id number]]   Certification: I Certify That Inpatient Hospital Services Are Medically Necessary For Greater Than 2 Midnights                 No follow-up provider specified.       Medication List      No changes were made to your prescriptions during this visit.            Donald Carcamo MD  09/04/24 1955

## 2024-09-04 NOTE — H&P
[facility]    Patient Identification:  Name:  Ashutosh Servin  Age:  69 y.o.  Sex:  male  :  1955  MRN:  [id number]   Admit Date: 2024   Visit Number:  [id number]  Room number:  112/12  Primary Care Physician:  Pollo Servin MD     Subjective     Chief complaint:    Chief Complaint   Patient presents with    Rapid Heart Rate       History of presenting illness:   Patient is a 69-year-old male with history sniffer hypertension, psoriatic arthritis and previous isolated episode of atrial fibrillation who comes to the ER with reports of feeling unwell, dizzy/lightheadedness and palpitations.  Patient felt normal up until today.  For the past 1 to 2 days he has been taking bowel prep for a follow-up colonoscopy that was performed earlier this morning.  He stated he had multiple episodes of diarrhea after taking the bowel prep.  He said he had precancerous polyps 8 years ago and today's colonoscopy was to follow-up for those polyps.  He denies any previous fever/chills or other symptoms.  He denies any chest pain/pressure or tightness or other symptoms.    Previously he stated he had an isolated episode of A-fib that was felt to be stress-induced and was not continued on any medications including anticoagulation.  He has had no further cardiac issues since that time.      In the ER, he was initially hypotensive, in A-fib with RVR and received IV Lopressor x 1 and 1 L normal saline.  At the time of my exam, he states he is feeling better, heart rate still between 111 and 130 however his symptoms are improved and he is currently eating dinner.    ---------------------------------------------------------------------------------------------------------------------   Review of Systems   Constitutional:  Negative for chills, fatigue and fever.   HENT:  Negative for congestion, sinus pain and sore throat.    Respiratory:  Positive for shortness of breath. Negative for  cough, chest tightness and wheezing.    Cardiovascular:  Positive for palpitations. Negative for chest pain and leg swelling.   Gastrointestinal:  Negative for abdominal pain, constipation, diarrhea, nausea and vomiting.   Genitourinary:  Negative for dysuria, frequency, hematuria and urgency.   Musculoskeletal:  Negative for arthralgias and myalgias.   Neurological:  Positive for dizziness and light-headedness. Negative for numbness and headaches.   Psychiatric/Behavioral:  Negative for confusion.      ---------------------------------------------------------------------------------------------------------------------   Past Medical History:   Diagnosis Date    Bronchitis     Epicondylitis, lateral     LEFT    High risk medication use     Immunosuppression     Nephrolithiasis     Odynophagia     Psoriasis     Psoriatic arthritis     Secondary osteoarthritis      Past Surgical History:   Procedure Laterality Date    TONSILLECTOMY       Family History   Problem Relation Age of Onset    Arthritis Other      Social History     Socioeconomic History    Marital status:    Tobacco Use    Smoking status: Never     Passive exposure: Never    Smokeless tobacco: Never   Vaping Use    Vaping status: Never Used   Substance and Sexual Activity    Alcohol use: Not Currently    Drug use: Never    Sexual activity: Defer     ---------------------------------------------------------------------------------------------------------------------   Allergies:  Penicillins  ---------------------------------------------------------------------------------------------------------------------   Medications below are reported home medications pulling from within the system; at this time, these medications have not been reconciled unless otherwise specified and are in the verification process for further verifcation as current home medications.    Prior to Admission Medications       Prescriptions Last Dose Informant Patient Reported?  Taking?    etanercept (Enbrel) 50 MG/ML solution prefilled syringe injection Unknown  No No    Inject 1 mL under the skin into the appropriate area as directed Every 7 (Seven) Days.    latanoprost (XALATAN) 0.005 % ophthalmic solution Unknown  Yes No    Administer 1 drop to both eyes Every Night.    mometasone (ELOCON) 0.1 % ointment   Yes No    Apply 1 Application topically to the appropriate area as directed Daily.    multivitamin (MULTIVITAMIN PO)   Yes No    Take 1 tablet by mouth Daily.    predniSONE (DELTASONE) 10 MG tablet Unknown  Yes No    Take 1 tablet by mouth 2 (Two) Times a Day.          Objective     Vital Signs:  Temp:  [97.8 °F (36.6 °C)] 97.8 °F (36.6 °C)  Heart Rate:  [] 111  Resp:  [18] 18  BP: ()/(57-90) 114/74    Mean Arterial Pressure (Non-Invasive) for the past 24 hrs (Last 3 readings):   Noninvasive MAP (mmHg)   09/04/24 1813 89   09/04/24 1800 75   09/04/24 1745 83     SpO2:  [91 %-97 %] 93 %  on  Flow (L/min):  [2] 2;   Device (Oxygen Therapy): nasal cannula  Body mass index is 31.19 kg/m².    Wt Readings from Last 3 Encounters:   09/04/24 104 kg (230 lb)   08/21/24 105 kg (231 lb 14.4 oz)      ---------------------------------------------------------------------------------------------------------------------   Physical Exam:  Constitutional: Early male, nontoxic, speaking clearly in full sentences, well-developed and well-nourished.  No respiratory distress.      HENT:  Head: Normocephalic and atraumatic.  Mouth: Dry mucous membranes.    Eyes:  Conjunctivae and EOM are normal.  No scleral icterus.  Neck:  Neck supple.  No JVD present.    Cardiovascular: Irregularly irregular and normal heart sounds with no murmur.  Pulmonary/Chest:  No respiratory distress, no wheezes, no crackles, with normal breath sounds and good air movement.  Abdominal:  Soft.  Bowel sounds are normal.  No distension and no tenderness.   Musculoskeletal:  No tenderness, and no deformity.  No red or  swollen joints anywhere.    Neurological:  Alert and oriented to person, place, and time.  No cranial nerve deficit.  No tongue deviation.  No facial droop.  No slurred speech.   Skin:  Skin is warm and dry.  No rash noted.  No pallor.   Peripheral vascular:  No edema and pulses on all 4 extremities.    ---------------------------------------------------------------------------------------------------------------------  EKG: A-fib with RVR, rate 111, QTc 451, no acute ST or T wave changes    ECG 12 Lead Rhythm Change   Preliminary Result   Test Reason : Rhythm Change   Blood Pressure :   */*   mmHG   Vent. Rate : 106 BPM     Atrial Rate : 125 BPM      P-R Int :   * ms          QRS Dur :  80 ms       QT Int : 338 ms       P-R-T Axes :   * -28  35 degrees      QTc Int : 448 ms      Atrial fibrillation with rapid ventricular response   Low voltage QRS   Abnormal ECG   When compared with ECG of 04-SEP-2024 14:33, (Unconfirmed)   Atrial fibrillation has replaced Junctional rhythm   Minimal criteria for Anterior infarct are no longer present   Criteria for Inferior infarct are no longer present   ST elevation now present in Lateral leads      Referred By: HALE           Confirmed By:       ECG 12 Lead Tachycardia   Preliminary Result   Test Reason : Tachycardia   Blood Pressure :   */*   mmHG   Vent. Rate : 111 BPM     Atrial Rate :  90 BPM      P-R Int :   * ms          QRS Dur :  74 ms       QT Int : 332 ms       P-R-T Axes :   * -34  19 degrees      QTc Int : 451 ms      ** Poor data quality, interpretation may be adversely affected   Accelerated Junctional rhythm with premature supraventricular complexes   Left axis deviation   Inferior infarct , age undetermined   Cannot rule out Anterior infarct , age undetermined   Abnormal ECG   No previous ECGs available      Referred By: HALE           Confirmed By:           Telemetry: Reviewed    I have personally looked at both the EKG and the telemetry strips.    Last  "echocardiogram:  None on file    --------------------------------------------------------------------------------------------------------------------  Labs:  Results from last 7 days   Lab Units 09/04/24  1501   WBC 10*3/mm3 11.02*   HEMOGLOBIN g/dL 18.9*   HEMATOCRIT % 54.3*   MCV fL 93.6   MCHC g/dL 34.8   PLATELETS 10*3/mm3 264   INR  1.09         Results from last 7 days   Lab Units 09/04/24  1501   SODIUM mmol/L 138   POTASSIUM mmol/L 3.4*   MAGNESIUM mg/dL 2.7*   CHLORIDE mmol/L 98   CO2 mmol/L 26.1   BUN mg/dL 20   CREATININE mg/dL 1.36*   CALCIUM mg/dL 8.9   GLUCOSE mg/dL 82   ALBUMIN g/dL 4.4   BILIRUBIN mg/dL 1.2   ALK PHOS U/L 74   AST (SGOT) U/L 35   ALT (SGPT) U/L 51*   Estimated Creatinine Clearance: 64.9 mL/min (A) (by C-G formula based on SCr of 1.36 mg/dL (H)).    No results found for: \"AMMONIA\"  Results from last 7 days   Lab Units 09/04/24  1745 09/04/24  1501   CK TOTAL U/L  --  251*   HSTROP T ng/L 14 15         Glucose   Date/Time Value Ref Range Status   09/04/2024 1500 83 70 - 130 mg/dL Final     No results found for: \"TSH\", \"FREET4\"  No results found for: \"PREGTESTUR\", \"PREGSERUM\", \"HCG\", \"HCGQUANT\"  Pain Management Panel           No data to display              Brief Urine Lab Results       None          No results found for: \"BLOODCX\"  No results found for: \"URINECX\"  No results found for: \"WOUNDCX\"  No results found for: \"STOOLCX\"    I have personally looked at the labs and they are summarized above.  ----------------------------------------------------------------------------------------------------------------------  Detailed radiology reports for the last 24 hours:    Imaging Results (Last 24 Hours)       Procedure Component Value Units Date/Time    CT Abdomen Pelvis With Contrast [[id number]] Collected: 09/04/24 1704     Updated: 09/04/24 1708    Narrative:      EXAM:    CT Abdomen and Pelvis With Intravenous Contrast     EXAM DATE:    9/4/2024 4:34 PM     CLINICAL HISTORY:    A-fib " RVR after colonoscopy     TECHNIQUE:    Axial computed tomography images of the abdomen and pelvis with  intravenous contrast.  Sagittal and coronal reformatted images were  created and reviewed.  This CT exam was performed using one or more of  the following dose reduction techniques:  automated exposure control,  adjustment of the mA and/or kV according to patient size, and/or use of  iterative reconstruction technique.     COMPARISON:    No relevant prior studies available.     FINDINGS:    Lung bases:  Bibasilar atelectasis.    Heart:  Cardiomegaly and coronary calcifications.    Mediastinum:  Hiatal hernia.      ABDOMEN:    Liver:  Diffuse fatty infiltration of liver.    Gallbladder and bile ducts:  Cholecystectomy.  No ductal dilation.    Pancreas:  Unremarkable as visualized.  No mass.  No ductal dilation.    Spleen:  Unremarkable as visualized.  No splenomegaly.    Adrenals:  Unremarkable as visualized.  No mass.    Kidneys and ureters:  Nonobstructing bilateral kidney stones. No  ureteral stones or obstructive uropathy.    Stomach and bowel:  Nonspecific mesenteritis. No mesenteric  adenopathy.  No obstruction.      PELVIS:    Appendix:  Normal appendix.    Bladder:  Unremarkable as visualized.  No mass.    Reproductive:  Unremarkable as visualized.      ABDOMEN and PELVIS:    Intraperitoneal space:  Unremarkable as visualized.  No  pneumoperitoneum.  No free fluid or free air.    Bones/joints:  Degenerative changes lumbar spine. No acute bony  findings.  No dislocation.    Soft tissues:  Unremarkable as visualized.    Vasculature:  Unremarkable as visualized.  No abdominal aortic  aneurysm.    Lymph nodes:  No iliac or retroperitoneal adenopathy.       Impression:      1. No acute findings identified within abdomen or pelvis. No free fluid  or free air.  2.  Nonspecific mesenteritis; commonly incidental finding. No mesenteric  adenopathy.  3.  Hiatal hernia.  4.  Diffuse fatty infiltration of liver.  5.   Cholecystectomy.  6.  Nonobstructing bilateral kidney stones. No ureteral stones or  obstructive uropathy.  7.  Bibasilar atelectasis.        This report was finalized on 9/4/2024 5:06 PM by Dr. Clive Cooper MD.       CT Angiogram Chest [[id number]] Collected: 09/04/24 1700     Updated: 09/04/24 1706    Narrative:      EXAM:    CT Angiography Chest With Intravenous Contrast     EXAM DATE:    9/4/2024 4:34 PM     CLINICAL HISTORY:    low oxygen, tachycardic, no PMH COPD     TECHNIQUE:    Axial computed tomographic angiography images of the chest with  intravenous contrast.  This CT exam was performed using one or more of  the following dose reduction techniques:  automated exposure control,  adjustment of the mA and/or kV according to patient size, and/or use of  iterative reconstruction technique.    MIP reconstructed images were created and reviewed.     COMPARISON:    No relevant prior studies available.     FINDINGS:    Pulmonary arteries:  Limited assessment of the pulmonary arteries due  to timing of the contrast bolus. No pulmonary arterial trunk filling  defect or filling defects of the proximal main pulmonary arteries.  Assessment of the lobar, segmental, and subsegmental arteries is  nondiagnostic.    Aorta:  Contrast bolus timing is optimized for the arterial phase of  the study. Aorta is within normal limits with no dissection, aneurysm,  or significant stenosis.  Three-vessel arch configuration.    Lungs and pleural spaces:  Bibasilar atelectasis.  No mass.  No  effusion or pneumothorax.    Heart:  Mild cardiomegaly.  Moderate coronary artery calcifications.   No significant pericardial effusion.  No evidence of RV dysfunction.    Mediastinum:  Moderate hiatal hernia. Distal esophageal wall  thickening. Correlate for reflux esophagitis.    Bones/joints:  No dislocation.  No acute bony findings identified.    Soft tissues:  Unremarkable as visualized.    Lymph nodes:  Unremarkable as visualized.  No  enlarged lymph nodes.    Liver:  Diffuse fatty infiltration of liver.       Impression:      1.  No effusion or pneumothorax.  2.  Moderate hiatal hernia. Distal esophageal wall thickening. Correlate  for reflux esophagitis.  3.  Aorta is within normal limits with no dissection, aneurysm, or  significant stenosis.  4.  Mild cardiomegaly.  5.  Diffuse fatty infiltration of liver.  6.  Moderate coronary artery calcifications.  7.  Bibasilar atelectasis.        This report was finalized on 9/4/2024 5:04 PM by Dr. Clive Cooper MD.       XR Chest 1 View [[id number]] Collected: 09/04/24 1620     Updated: 09/04/24 1622    Narrative:      EXAM:    XR Chest, 1 View     EXAM DATE:    9/4/2024 4:12 PM     CLINICAL HISTORY:    afib     TECHNIQUE:    Frontal view of the chest.     COMPARISON:    No relevant prior studies available.     FINDINGS:    Lungs and pleural spaces:  Unremarkable as visualized.  No  consolidation.  No pneumothorax.    Heart:  Unremarkable as visualized.  No cardiomegaly.    Mediastinum:  Unremarkable as visualized.  Normal mediastinal contour.    Bones/joints:  Unremarkable as visualized.  No acute fracture.       Impression:        Unremarkable exam. No acute cardiopulmonary findings identified.        This report was finalized on 9/4/2024 4:20 PM by Dr. Clive Cooper MD.             Final impressions for the last 30 days of radiology reports:    CT Abdomen Pelvis With Contrast    Result Date: 9/4/2024  1. No acute findings identified within abdomen or pelvis. No free fluid or free air. 2.  Nonspecific mesenteritis; commonly incidental finding. No mesenteric adenopathy. 3.  Hiatal hernia. 4.  Diffuse fatty infiltration of liver. 5.  Cholecystectomy. 6.  Nonobstructing bilateral kidney stones. No ureteral stones or obstructive uropathy. 7.  Bibasilar atelectasis.   This report was finalized on 9/4/2024 5:06 PM by Dr. Clive Cooper MD.      CT Angiogram Chest    Result Date: 9/4/2024  1.  No effusion  or pneumothorax. 2.  Moderate hiatal hernia. Distal esophageal wall thickening. Correlate for reflux esophagitis. 3.  Aorta is within normal limits with no dissection, aneurysm, or significant stenosis. 4.  Mild cardiomegaly. 5.  Diffuse fatty infiltration of liver. 6.  Moderate coronary artery calcifications. 7.  Bibasilar atelectasis.   This report was finalized on 9/4/2024 5:04 PM by Dr. Clive Cooper MD.      XR Chest 1 View    Result Date: 9/4/2024    Unremarkable exam. No acute cardiopulmonary findings identified.   This report was finalized on 9/4/2024 4:20 PM by Dr. Clive Cooper MD.     I have personally looked at the radiology images and read the final radiology report.    Assessment & Plan      Patient is a 69-year-old male with history sniffer hypertension, psoriatic arthritis and previous isolated episode of atrial fibrillation who comes to the ER with reports of feeling unwell, dizzy/lightheadedness and palpitations.    #New onset atrial fibrillation with RVR  #Chronic kidney disease stage II  #History of precancerous polyps status post follow-up colonoscopy  #Psoriatic arthritis   #Alpha gal  #Hypokalemia  #GERD    --Patient presented w/ dizziness/lightheadedness, palpitations after taking bowel prep the last few days for follow-up colonoscopy was performed earlier this morning.  In the ER, noted to be in A-fib with RVR in the 130s, hypotensive--> received IV Lopressor x 1 and 1 L IV fluid bolus  --Admission labs showed CK2 51, troponins negative x 2,  --CTPE showed negative for PE, noted reflux  --CT abdomen pelvis noted nonspecific mesenteritis (likely from diarrhea from bowel prep)  --Echocardiogram ordered to evaluate systolic and diastolic function  -- Start Eliquis for stroke prophylaxis  -- Start metoprolol to tartrate twice daily, aspirin, high intensity statin  -- Will consider Cardizem bolus/drip if rate control remains an issue  --N.p.o. at midnight in the event A-fib is still present and  we will consider cardiology consultation for consideration of cardioversion  --Admit to telemetry     Checklist:  Antibiotics: None  Steroids: None  DVT ppx: Eliquis   GI ppx: ppi  Diet: regular  Code: CPR, full  Risk/dispo: Patient is a high risk due to new onset atrial fibrillation with RVR requiring further workup.  Anticipate 24 to 48-hour stay pending clinical course.  Disposition expected Home when medically stable.    Chirag Mas DO  [facility] Center[facility]  09/04/24  18:59 EDT

## 2024-09-05 ENCOUNTER — APPOINTMENT (OUTPATIENT)
Dept: CARDIOLOGY | Facility: HOSPITAL | Age: 69
DRG: 309 | End: 2024-09-05
Payer: MEDICARE

## 2024-09-05 VITALS
SYSTOLIC BLOOD PRESSURE: 101 MMHG | OXYGEN SATURATION: 92 % | WEIGHT: 228.4 LBS | TEMPERATURE: 97.8 F | HEIGHT: 72 IN | BODY MASS INDEX: 30.94 KG/M2 | DIASTOLIC BLOOD PRESSURE: 64 MMHG | HEART RATE: 89 BPM | RESPIRATION RATE: 20 BRPM

## 2024-09-05 LAB
ANION GAP SERPL CALCULATED.3IONS-SCNC: 11.1 MMOL/L (ref 5–15)
BASOPHILS # BLD AUTO: 0.04 10*3/MM3 (ref 0–0.2)
BASOPHILS NFR BLD AUTO: 0.2 % (ref 0–1.5)
BH CV ECHO MEAS - ACS: 1.4 CM
BH CV ECHO MEAS - AO MAX PG: 4.9 MMHG
BH CV ECHO MEAS - AO MEAN PG: 3 MMHG
BH CV ECHO MEAS - AO ROOT DIAM: 3.8 CM
BH CV ECHO MEAS - AO V2 MAX: 111 CM/SEC
BH CV ECHO MEAS - AO V2 VTI: 18.4 CM
BH CV ECHO MEAS - EDV(CUBED): 113.4 ML
BH CV ECHO MEAS - EDV(MOD-SP4): 96.9 ML
BH CV ECHO MEAS - EF(MOD-SP4): 63 %
BH CV ECHO MEAS - ESV(CUBED): 27.8 ML
BH CV ECHO MEAS - ESV(MOD-SP4): 35.9 ML
BH CV ECHO MEAS - FS: 37.4 %
BH CV ECHO MEAS - IVS/LVPW: 0.93 CM
BH CV ECHO MEAS - IVSD: 1.02 CM
BH CV ECHO MEAS - LA DIMENSION: 3.1 CM
BH CV ECHO MEAS - LAT PEAK E' VEL: 5.6 CM/SEC
BH CV ECHO MEAS - LV DIASTOLIC VOL/BSA (35-75): 43 CM2
BH CV ECHO MEAS - LV MASS(C)D: 186.2 GRAMS
BH CV ECHO MEAS - LV SYSTOLIC VOL/BSA (12-30): 15.9 CM2
BH CV ECHO MEAS - LVIDD: 4.8 CM
BH CV ECHO MEAS - LVIDS: 3 CM
BH CV ECHO MEAS - LVOT AREA: 3.5 CM2
BH CV ECHO MEAS - LVOT DIAM: 2.1 CM
BH CV ECHO MEAS - LVPWD: 1.1 CM
BH CV ECHO MEAS - MED PEAK E' VEL: 6 CM/SEC
BH CV ECHO MEAS - MV A MAX VEL: 36.8 CM/SEC
BH CV ECHO MEAS - MV E MAX VEL: 66 CM/SEC
BH CV ECHO MEAS - MV E/A: 1.79
BH CV ECHO MEAS - PA ACC TIME: 0.12 SEC
BH CV ECHO MEAS - RAP SYSTOLE: 10 MMHG
BH CV ECHO MEAS - RVSP: 35 MMHG
BH CV ECHO MEAS - SV(MOD-SP4): 61 ML
BH CV ECHO MEAS - SVI(MOD-SP4): 27.1 ML/M2
BH CV ECHO MEAS - TAPSE (>1.6): 2.24 CM
BH CV ECHO MEAS - TR MAX PG: 25 MMHG
BH CV ECHO MEAS - TR MAX VEL: 250 CM/SEC
BH CV ECHO MEASUREMENTS AVERAGE E/E' RATIO: 11.38
BUN SERPL-MCNC: 22 MG/DL (ref 8–23)
BUN/CREAT SERPL: 17.1 (ref 7–25)
CALCIUM SPEC-SCNC: 8.2 MG/DL (ref 8.6–10.5)
CHLORIDE SERPL-SCNC: 106 MMOL/L (ref 98–107)
CO2 SERPL-SCNC: 22.9 MMOL/L (ref 22–29)
CREAT SERPL-MCNC: 1.29 MG/DL (ref 0.76–1.27)
DEPRECATED RDW RBC AUTO: 44.6 FL (ref 37–54)
EGFRCR SERPLBLD CKD-EPI 2021: 60.4 ML/MIN/1.73
EOSINOPHIL # BLD AUTO: 0.02 10*3/MM3 (ref 0–0.4)
EOSINOPHIL NFR BLD AUTO: 0.1 % (ref 0.3–6.2)
ERYTHROCYTE [DISTWIDTH] IN BLOOD BY AUTOMATED COUNT: 12.8 % (ref 12.3–15.4)
GLUCOSE SERPL-MCNC: 80 MG/DL (ref 65–99)
HCT VFR BLD AUTO: 50.2 % (ref 37.5–51)
HGB BLD-MCNC: 16.9 G/DL (ref 13–17.7)
IMM GRANULOCYTES # BLD AUTO: 0.09 10*3/MM3 (ref 0–0.05)
IMM GRANULOCYTES NFR BLD AUTO: 0.5 % (ref 0–0.5)
LYMPHOCYTES # BLD AUTO: 2.07 10*3/MM3 (ref 0.7–3.1)
LYMPHOCYTES NFR BLD AUTO: 12.4 % (ref 19.6–45.3)
MCH RBC QN AUTO: 32 PG (ref 26.6–33)
MCHC RBC AUTO-ENTMCNC: 33.7 G/DL (ref 31.5–35.7)
MCV RBC AUTO: 95.1 FL (ref 79–97)
MONOCYTES # BLD AUTO: 1.28 10*3/MM3 (ref 0.1–0.9)
MONOCYTES NFR BLD AUTO: 7.7 % (ref 5–12)
NEUTROPHILS NFR BLD AUTO: 13.23 10*3/MM3 (ref 1.7–7)
NEUTROPHILS NFR BLD AUTO: 79.1 % (ref 42.7–76)
NRBC BLD AUTO-RTO: 0 /100 WBC (ref 0–0.2)
PLATELET # BLD AUTO: 237 10*3/MM3 (ref 140–450)
PMV BLD AUTO: 10 FL (ref 6–12)
POTASSIUM SERPL-SCNC: 3.9 MMOL/L (ref 3.5–5.2)
RBC # BLD AUTO: 5.28 10*6/MM3 (ref 4.14–5.8)
SODIUM SERPL-SCNC: 140 MMOL/L (ref 136–145)
WBC NRBC COR # BLD AUTO: 16.73 10*3/MM3 (ref 3.4–10.8)

## 2024-09-05 PROCEDURE — 85025 COMPLETE CBC W/AUTO DIFF WBC: CPT | Performed by: STUDENT IN AN ORGANIZED HEALTH CARE EDUCATION/TRAINING PROGRAM

## 2024-09-05 PROCEDURE — 36415 COLL VENOUS BLD VENIPUNCTURE: CPT | Performed by: STUDENT IN AN ORGANIZED HEALTH CARE EDUCATION/TRAINING PROGRAM

## 2024-09-05 PROCEDURE — 80048 BASIC METABOLIC PNL TOTAL CA: CPT | Performed by: STUDENT IN AN ORGANIZED HEALTH CARE EDUCATION/TRAINING PROGRAM

## 2024-09-05 PROCEDURE — 93005 ELECTROCARDIOGRAM TRACING: CPT | Performed by: STUDENT IN AN ORGANIZED HEALTH CARE EDUCATION/TRAINING PROGRAM

## 2024-09-05 PROCEDURE — 93306 TTE W/DOPPLER COMPLETE: CPT

## 2024-09-05 PROCEDURE — 93306 TTE W/DOPPLER COMPLETE: CPT | Performed by: INTERNAL MEDICINE

## 2024-09-05 PROCEDURE — 99239 HOSP IP/OBS DSCHRG MGMT >30: CPT | Performed by: STUDENT IN AN ORGANIZED HEALTH CARE EDUCATION/TRAINING PROGRAM

## 2024-09-05 RX ORDER — LATANOPROST 50 UG/ML
1 SOLUTION/ DROPS OPHTHALMIC NIGHTLY
Status: CANCELLED | OUTPATIENT
Start: 2024-09-05

## 2024-09-05 RX ORDER — PREDNISONE 10 MG/1
10 TABLET ORAL 2 TIMES DAILY
Status: CANCELLED | OUTPATIENT
Start: 2024-09-05 | End: 2024-09-06

## 2024-09-05 RX ORDER — PANTOPRAZOLE SODIUM 40 MG/1
40 TABLET, DELAYED RELEASE ORAL
Qty: 30 TABLET | Refills: 0 | Status: SHIPPED | OUTPATIENT
Start: 2024-09-06

## 2024-09-05 RX ORDER — NITROGLYCERIN 0.4 MG/1
0.4 TABLET SUBLINGUAL
Qty: 30 TABLET | Refills: 0 | Status: SHIPPED | OUTPATIENT
Start: 2024-09-05

## 2024-09-05 RX ORDER — METOPROLOL TARTRATE 25 MG/1
25 TABLET, FILM COATED ORAL EVERY 12 HOURS SCHEDULED
Qty: 60 TABLET | Refills: 0 | Status: SHIPPED | OUTPATIENT
Start: 2024-09-05 | End: 2024-10-05

## 2024-09-05 RX ORDER — ONDANSETRON 4 MG/1
4 TABLET, FILM COATED ORAL EVERY 8 HOURS PRN
Qty: 30 TABLET | Refills: 0 | Status: SHIPPED | OUTPATIENT
Start: 2024-09-05

## 2024-09-05 RX ORDER — ASPIRIN 81 MG/1
81 TABLET ORAL DAILY
Qty: 30 TABLET | Refills: 0 | Status: SHIPPED | OUTPATIENT
Start: 2024-09-05

## 2024-09-05 RX ORDER — ROSUVASTATIN CALCIUM 20 MG/1
20 TABLET, COATED ORAL NIGHTLY
Qty: 30 TABLET | Refills: 0 | Status: SHIPPED | OUTPATIENT
Start: 2024-09-05

## 2024-09-05 RX ADMIN — APIXABAN 5 MG: 5 TABLET, FILM COATED ORAL at 10:38

## 2024-09-05 RX ADMIN — ASPIRIN 81 MG: 81 TABLET, COATED ORAL at 10:37

## 2024-09-05 RX ADMIN — METOPROLOL TARTRATE 25 MG: 25 TABLET, FILM COATED ORAL at 10:37

## 2024-09-05 RX ADMIN — Medication 10 ML: at 00:13

## 2024-09-05 NOTE — DISCHARGE SUMMARY
[facility] HOSPITALISTS DISCHARGE SUMMARY    Patient Identification:  Name:  Ashutosh Servin  Age:  69 y.o.  Sex:  male  :  1955  MRN:  [id number]  Visit Number:  [id number]    Date of Admission: 2024  Date of Discharge:  2024     PCP: Pollo Servin MD    DISCHARGE DIAGNOSIS  #New onset atrial fibrillation with RVR  #Chronic kidney disease stage II  #History of precancerous polyps status post follow-up colonoscopy  #Psoriatic arthritis   #Alpha gal  #Hypokalemia  #GERD    CONSULTS   None    PROCEDURES PERFORMED  None    HOSPITAL COURSE  Patient is a 69 y.o. male presented to [facility] complaining of feeling unwell, dizzy/lightheadedness, palpitations after taking bowel prep and outpatient colonoscopy.  Please see the admitting history and physical for further details.  Upon presentation to the ER, patient was noted to be in atrial fibrillation with RVR in the 130s, hypotensive with improvement after IV Lopressor and IV fluid resuscitation.  Patient had noted remote history of atrial fibrillation which was felt to be stress-induced and had not followed up outpatient with cardiology but was not on anticoagulation or rate control.  CT angio was negative for PE but noted reflux and a CT abdomen pelvis noted nonspecific mesenteritis felt to be due to bowel prep and recurrent diarrhea.  An echocardiogram was unremarkable.  Although patient's [id number] score was remarkably only a 1, I discussed the risk and benefits of starting anticoagulation which patient was agreeable to and he was discharged home on Eliquis 5 mg twice daily.  Patient was continued on metoprolol to tartrate for rate control, aspirin and high intensity statin.  I did discuss with cardiology prior to discharge and as patient was asymptomatic, rate controlled and felt to be back to baseline, cardiology recommended outpatient follow-up and consideration for cardioversion in the outpatient setting.    At the  time of discharge, patient was hemodynamically stable, felt to be back to baseline and have reached maximum medical benefit.  Patient was discharged with outpatient follow-up as noted below.    VITAL SIGNS:  Temp:  [97.8 °F (36.6 °C)-99.2 °F (37.3 °C)] 97.8 °F (36.6 °C)  Heart Rate:  [] 89  Resp:  [16-20] 20  BP: ()/() 101/64  SpO2:  [91 %-98 %] 92 %  on  Flow (L/min):  [0-2] 0;   Device (Oxygen Therapy): room air    Body mass index is 30.98 kg/m².  Wt Readings from Last 3 Encounters:   09/05/24 104 kg (228 lb 6.3 oz)   08/21/24 105 kg (231 lb 14.4 oz)       PHYSICAL EXAM:  Constitutional: Early male, nontoxic, speaking clearly in full sentences, well-developed and well-nourished.  No respiratory distress.      HENT:  Head: Normocephalic and atraumatic.  Mouth: Dry mucous membranes.    Eyes:  Conjunctivae and EOM are normal.  No scleral icterus.  Neck:  Neck supple.  No JVD present.    Cardiovascular: Irregularly irregular and normal heart sounds with no murmur.  Pulmonary/Chest:  No respiratory distress, no wheezes, no crackles, with normal breath sounds and good air movement.  Abdominal:  Soft.  Bowel sounds are normal.  No distension and no tenderness.   Musculoskeletal:  No tenderness, and no deformity.  No red or swollen joints anywhere.    Neurological:  Alert and oriented to person, place, and time.  No cranial nerve deficit.  No tongue deviation.  No facial droop.  No slurred speech.   Skin:  Skin is warm and dry.  No rash noted.  No pallor.   Peripheral vascular:  No edema and pulses on all 4 extremities.    DISCHARGE DISPOSITION   Stable    DISCHARGE MEDICATIONS:     Discharge Medications        New Medications        Instructions Start Date   apixaban 5 MG tablet tablet  Commonly known as: ELIQUIS   5 mg, Oral, Every 12 Hours Scheduled      aspirin 81 MG EC tablet   81 mg, Oral, Daily      metoprolol tartrate 25 MG tablet  Commonly known as: LOPRESSOR   25 mg, Oral, Every 12 Hours  Scheduled      nitroglycerin 0.4 MG SL tablet  Commonly known as: NITROSTAT   0.4 mg, Sublingual, Every 5 Minutes PRN, Take no more than 3 doses in 15 minutes.      ondansetron 4 MG tablet  Commonly known as: Zofran   4 mg, Oral, Every 8 Hours PRN      pantoprazole 40 MG EC tablet  Commonly known as: PROTONIX   40 mg, Oral, Every Early Morning   Start Date: September 6, 2024     rosuvastatin 20 MG tablet  Commonly known as: CRESTOR   20 mg, Oral, Nightly             Continue These Medications        Instructions Start Date   Enbrel 50 MG/ML injection  Generic drug: etanercept   50 mg, Subcutaneous, Every 7 Days      latanoprost 0.005 % ophthalmic solution  Commonly known as: XALATAN   1 drop, Right Eye, Nightly      predniSONE 10 MG tablet  Commonly known as: DELTASONE   10 mg, Oral, 2 Times Daily                 Additional Instructions for the Follow-ups that You Need to Schedule       Discharge Follow-up with PCP   As directed       Currently Documented PCP:    Pollo Servin MD    PCP Phone Number:    [phone]     Follow Up Details: [address] [place] [address]        Discharge Follow-up with Specialty: cardiology; 2 Weeks   As directed      Specialty: cardiology   Follow Up: 2 Weeks   Follow Up Details: afib, on eliquis/lopressor               Follow-up Information       Pollo Servin MD .    Specialty: Family Medicine  Why: [address] [place] [address]  Contact information:  [address] [place] [id number] [phone]                              TEST  RESULTS PENDING AT DISCHARGE       CODE STATUS  Code Status and Medical Interventions: CPR (Attempt to Resuscitate); Full Support   Ordered at: 09/04/24 1859     Code Status (Patient has no pulse and is not breathing):    CPR (Attempt to Resuscitate)     Medical Interventions (Patient has pulse or is breathing):    Full Support       Chirag Mas DO  [facility]ist  09/05/24  12:46 EDT    Please note that this discharge summary required more than 30 minutes  to complete.

## 2024-09-05 NOTE — PLAN OF CARE
Pt discharged home with spouse. IV removed; telemetry removed and returned to war room; belongings gathered and sent with pt; and educated on discharge instructions. No s/s of acute distress noted. VSS

## 2024-09-05 NOTE — PHARMACY PATIENT ASSISTANCE
Pharmacy checked cost and coverage of Eliquis. Per patient's insurance, a 30-day supply of Eliquis will have no copay. No affordability issues or concerns identified at this time.     Thank you,     Rose Mary Davis, Pharmacy Intern  PGY-1 Community Pharmacy Resident  09/05/24  11:29 EDT

## 2024-09-05 NOTE — PLAN OF CARE
Goal Outcome Evaluation:   Pt arrived to the unit this shift. Pt on RA with sats maintaining above 90%. No s/s of acute distress noted at this time. Plan of care ongoing.

## 2024-09-09 LAB
QT INTERVAL: 332 MS
QT INTERVAL: 338 MS
QT INTERVAL: 350 MS
QTC INTERVAL: 439 MS
QTC INTERVAL: 448 MS
QTC INTERVAL: 451 MS

## 2024-09-09 NOTE — PAYOR COMM NOTE
"CONTACT:  HENNY URRUTIA RN  UTILIZATION MANAGEMENT DEPT.   [facility]   [address] [facility], [address]   PHONE:  [phone]   FAX: [phone]       [facility] 2024    REF # [id number]          Ashutosh Pino (69 y.o. Male)       Date of Birth   1955    Social Security Number       Address   [address] [address]    Home Phone   [phone]    MRN   [id number]       [employer]   None    Marital Status                               Admission Date   24    Admission Type   Emergency    Admitting Provider   Cihrag Mas DO    Attending Provider       Department, Room/Bed   [facility] [address] [facility], 3311/2S       Discharge Date   2024    Discharge Disposition   Home or Self Care    Discharge Destination   Home                              Attending Provider: (none)   Allergies: Penicillins    Isolation: None   Infection: None   Code Status: Prior    Ht: 182.9 cm (72\")   Wt: 104 kg (228 lb 6.3 oz)    Admission Cmt: None   Principal Problem: Atrial fibrillation with RVR [I48.91]                   Active Insurance as of 2024       Primary Coverage       Payor Plan Insurance Group Employer/Plan Group    HUMANA MEDICARE REPLACEMENT HU[employer] MED ADV GROUP [id number]       Payor Plan Address Payor Plan Phone Number Payor Plan Fax Number Effective Dates    [address] [address] [phone]  2021 - None Entered    [facility] [address]         Subscriber Name Subscriber Birth Date Member ID       ASHUTOSH PINO 1955 [id number]                     Emergency Contacts        (Rel.) Home Phone Work Phone Mobile Phone    Lucia Pino (Spouse) [phone] -- --                 Discharge Summary        Chirag Mas DO at 24 1246              [facility] HOSPITALISTS DISCHARGE SUMMARY    Patient Identification:  Name:  Ashutosh Pino  Age:  69 y.o.  Sex:  male  :  1955  MRN:  [id number]  Visit Number:  " [id number]    Date of Admission: 9/4/2024  Date of Discharge:  9/5/2024     PCP: Pollo Servin MD    DISCHARGE DIAGNOSIS  #New onset atrial fibrillation with RVR  #Chronic kidney disease stage II  #History of precancerous polyps status post follow-up colonoscopy  #Psoriatic arthritis   #Alpha gal  #Hypokalemia  #GERD    CONSULTS   None    PROCEDURES PERFORMED  None    HOSPITAL COURSE  Patient is a 69 y.o. male presented to [facility] complaining of feeling unwell, dizzy/lightheadedness, palpitations after taking bowel prep and outpatient colonoscopy.  Please see the admitting history and physical for further details.  Upon presentation to the ER, patient was noted to be in atrial fibrillation with RVR in the 130s, hypotensive with improvement after IV Lopressor and IV fluid resuscitation.  Patient had noted remote history of atrial fibrillation which was felt to be stress-induced and had not followed up outpatient with cardiology but was not on anticoagulation or rate control.  CT angio was negative for PE but noted reflux and a CT abdomen pelvis noted nonspecific mesenteritis felt to be due to bowel prep and recurrent diarrhea.  An echocardiogram was unremarkable.  Although patient's [id number] score was remarkably only a 1, I discussed the risk and benefits of starting anticoagulation which patient was agreeable to and he was discharged home on Eliquis 5 mg twice daily.  Patient was continued on metoprolol to tartrate for rate control, aspirin and high intensity statin.  I did discuss with cardiology prior to discharge and as patient was asymptomatic, rate controlled and felt to be back to baseline, cardiology recommended outpatient follow-up and consideration for cardioversion in the outpatient setting.    At the time of discharge, patient was hemodynamically stable, felt to be back to baseline and have reached maximum medical benefit.  Patient was discharged with outpatient follow-up as noted  below.    VITAL SIGNS:  Temp:  [97.8 °F (36.6 °C)-99.2 °F (37.3 °C)] 97.8 °F (36.6 °C)  Heart Rate:  [] 89  Resp:  [16-20] 20  BP: ()/() 101/64  SpO2:  [91 %-98 %] 92 %  on  Flow (L/min):  [0-2] 0;   Device (Oxygen Therapy): room air    Body mass index is 30.98 kg/m².  Wt Readings from Last 3 Encounters:   09/05/24 104 kg (228 lb 6.3 oz)   08/21/24 105 kg (231 lb 14.4 oz)       PHYSICAL EXAM:  Constitutional: Early male, nontoxic, speaking clearly in full sentences, well-developed and well-nourished.  No respiratory distress.      HENT:  Head: Normocephalic and atraumatic.  Mouth: Dry mucous membranes.    Eyes:  Conjunctivae and EOM are normal.  No scleral icterus.  Neck:  Neck supple.  No JVD present.    Cardiovascular: Irregularly irregular and normal heart sounds with no murmur.  Pulmonary/Chest:  No respiratory distress, no wheezes, no crackles, with normal breath sounds and good air movement.  Abdominal:  Soft.  Bowel sounds are normal.  No distension and no tenderness.   Musculoskeletal:  No tenderness, and no deformity.  No red or swollen joints anywhere.    Neurological:  Alert and oriented to person, place, and time.  No cranial nerve deficit.  No tongue deviation.  No facial droop.  No slurred speech.   Skin:  Skin is warm and dry.  No rash noted.  No pallor.   Peripheral vascular:  No edema and pulses on all 4 extremities.    DISCHARGE DISPOSITION   Stable    DISCHARGE MEDICATIONS:     Discharge Medications        New Medications        Instructions Start Date   apixaban 5 MG tablet tablet  Commonly known as: ELIQUIS   5 mg, Oral, Every 12 Hours Scheduled      aspirin 81 MG EC tablet   81 mg, Oral, Daily      metoprolol tartrate 25 MG tablet  Commonly known as: LOPRESSOR   25 mg, Oral, Every 12 Hours Scheduled      nitroglycerin 0.4 MG SL tablet  Commonly known as: NITROSTAT   0.4 mg, Sublingual, Every 5 Minutes PRN, Take no more than 3 doses in 15 minutes.      ondansetron 4 MG  tablet  Commonly known as: Zofran   4 mg, Oral, Every 8 Hours PRN      pantoprazole 40 MG EC tablet  Commonly known as: PROTONIX   40 mg, Oral, Every Early Morning   Start Date: September 6, 2024     rosuvastatin 20 MG tablet  Commonly known as: CRESTOR   20 mg, Oral, Nightly             Continue These Medications        Instructions Start Date   Enbrel 50 MG/ML injection  Generic drug: etanercept   50 mg, Subcutaneous, Every 7 Days      latanoprost 0.005 % ophthalmic solution  Commonly known as: XALATAN   1 drop, Right Eye, Nightly      predniSONE 10 MG tablet  Commonly known as: DELTASONE   10 mg, Oral, 2 Times Daily                 Additional Instructions for the Follow-ups that You Need to Schedule       Discharge Follow-up with PCP   As directed       Currently Documented PCP:    Pollo Servin MD    PCP Phone Number:    [phone]     Follow Up Details: [address] [place] [address]        Discharge Follow-up with Specialty: cardiology; 2 Weeks   As directed      Specialty: cardiology   Follow Up: 2 Weeks   Follow Up Details: afib, on eliquis/lopressor               Follow-up Information       Pollo Servin MD .    Specialty: Family Medicine  Why: [address] [place] [address]  Contact information:  [address] [place] [id number]  [phone]                              TEST  RESULTS PENDING AT DISCHARGE       CODE STATUS  Code Status and Medical Interventions: CPR (Attempt to Resuscitate); Full Support   Ordered at: 09/04/24 1859     Code Status (Patient has no pulse and is not breathing):    CPR (Attempt to Resuscitate)     Medical Interventions (Patient has pulse or is breathing):    Full Support       Chirag Mas DO  [facility]  09/05/24  12:46 EDT    Please note that this discharge summary required more than 30 minutes to complete.     Electronically signed by Chirag Mas DO at 09/06/24 2006       Discharge Order (From admission, onward)       Start     Ordered    09/05/24 1246   Discharge patient  Once        Expected Discharge Date: 09/05/24   Expected Discharge Time: Morning   Discharge Disposition: Home or Self Care   Physician of Record for Attribution - Please select from Treatment Team: ANTONIA WARREN [[id number]]   Review needed by CMO to determine Physician of Record: No      Question Answer Comment   Physician of Record for Attribution - Please select from Treatment Team ANTONIA WARREN    Review needed by CMO to determine Physician of Record No        09/05/24 1240

## 2024-09-18 ENCOUNTER — SPECIALTY PHARMACY (OUTPATIENT)
Age: 69
End: 2024-09-18
Payer: MEDICARE

## 2024-10-09 ENCOUNTER — SPECIALTY PHARMACY (OUTPATIENT)
Age: 69
End: 2024-10-09
Payer: MEDICARE

## 2024-10-09 NOTE — PROGRESS NOTES
Specialty Pharmacy Refill Coordination Note     Ashutosh is a 69 y.o. male contacted today regarding refills of  Enbrel  specialty medication(s).    Reviewed and verified with patient:     YES  Specialty medication(s) and dose(s) confirmed: yes    Refill Questions      Flowsheet Row Most Recent Value   Changes to allergies? No   Changes to medications? Yes  [Eliquis, Protonix,]   New conditions or infections since last clinic visit No   Unplanned office visit, urgent care, ED, or hospital admission in the last 4 weeks  Yes  [AFIB 09/04]   How does patient/caregiver feel medication is working? Good   Financial problems or insurance changes  No   Since the previous refill, were any specialty medication doses or scheduled injections missed or delayed?  No   Does this patient require a clinical escalation to a pharmacist? Yes            Delivery Questions      Flowsheet Row Most Recent Value   Delivery method UPS   Delivery address verified with patient/caregiver? Yes   Delivery address Home   Number of medications in delivery 1   Medication(s) being filled and delivered Etanercept   Doses left of specialty medications 2   Copay verified? Yes   Copay amount 0   Copay form of payment No copayment ($0)   Ship Date 10/10   Delivery Date 10/11   Signature Required No                   Follow-up: 21 day(s)     Elana Garsia, PharmD  Specialty Pharmacy Technician

## 2024-10-14 ENCOUNTER — OFFICE VISIT (OUTPATIENT)
Dept: CARDIOLOGY | Facility: CLINIC | Age: 69
End: 2024-10-14
Payer: MEDICARE

## 2024-10-14 VITALS
OXYGEN SATURATION: 97 % | WEIGHT: 234 LBS | SYSTOLIC BLOOD PRESSURE: 130 MMHG | HEART RATE: 62 BPM | BODY MASS INDEX: 31.69 KG/M2 | HEIGHT: 72 IN | DIASTOLIC BLOOD PRESSURE: 70 MMHG

## 2024-10-14 DIAGNOSIS — I48.0 PAROXYSMAL ATRIAL FIBRILLATION: Primary | ICD-10-CM

## 2024-10-14 DIAGNOSIS — I10 PRIMARY HYPERTENSION: ICD-10-CM

## 2024-10-14 PROCEDURE — 3075F SYST BP GE 130 - 139MM HG: CPT | Performed by: INTERNAL MEDICINE

## 2024-10-14 PROCEDURE — 1159F MED LIST DOCD IN RCRD: CPT | Performed by: INTERNAL MEDICINE

## 2024-10-14 PROCEDURE — 1160F RVW MEDS BY RX/DR IN RCRD: CPT | Performed by: INTERNAL MEDICINE

## 2024-10-14 PROCEDURE — 3078F DIAST BP <80 MM HG: CPT | Performed by: INTERNAL MEDICINE

## 2024-10-14 PROCEDURE — 99203 OFFICE O/P NEW LOW 30 MIN: CPT | Performed by: INTERNAL MEDICINE

## 2024-10-14 RX ORDER — METOPROLOL TARTRATE 25 MG/1
25 TABLET, FILM COATED ORAL EVERY 12 HOURS SCHEDULED
Qty: 60 TABLET | Refills: 11 | Status: SHIPPED | OUTPATIENT
Start: 2024-10-14 | End: 2025-10-09

## 2024-10-14 RX ORDER — ROSUVASTATIN CALCIUM 20 MG/1
20 TABLET, COATED ORAL NIGHTLY
Qty: 90 TABLET | Refills: 3 | Status: SHIPPED | OUTPATIENT
Start: 2024-10-14

## 2024-10-14 NOTE — PROGRESS NOTES
Subjective:     Encounter Date:10/14/2024      Patient ID: Ashutosh Servin is a 69 y.o. male.    Chief Complaint: Atrial fibrillation  HPI  This is a 69-year-old male patient who presents to cardiology clinic for atrial fibrillation.  The patient was first diagnosed with atrial fibrillation several decades ago.  He recently had an episode of atrial fibrillation which occurred during a colonoscopy.  The arrhythmia spontaneously terminated.  He underwent an echocardiogram with no significant findings.  He was started on anticoagulation therapy with Eliquis 5 mg orally twice daily as well as Lopressor 25 mg orally twice daily.  He has no history of stroke or TIA.  He has a history of hypertension but no known personal history of diabetes.  He has no known history of vascular disease, congestive heart failure or valvular heart disease.  He is a non-smoker.  The following portions of the patient's history were reviewed and updated as appropriate: allergies, current medications, past family history, past medical history, past social history, past surgical history and problem  Review of Systems   Constitutional: Negative for chills, diaphoresis, fever, malaise/fatigue, weight gain and weight loss.   HENT:  Negative for ear discharge, hearing loss, hoarse voice and nosebleeds.    Eyes:  Negative for discharge, double vision, pain and photophobia.   Cardiovascular:  Negative for chest pain, claudication, cyanosis, dyspnea on exertion, irregular heartbeat, leg swelling, near-syncope, orthopnea, palpitations, paroxysmal nocturnal dyspnea and syncope.   Respiratory:  Negative for cough, hemoptysis, sputum production and wheezing.    Endocrine: Negative for cold intolerance, heat intolerance, polydipsia, polyphagia and polyuria.   Hematologic/Lymphatic: Negative for adenopathy and bleeding problem. Does not bruise/bleed easily.   Skin:  Negative for color change, flushing, itching and rash.   Musculoskeletal:  Negative for  muscle cramps, muscle weakness, myalgias and stiffness.   Gastrointestinal:  Negative for abdominal pain, diarrhea, hematemesis, hematochezia, nausea and vomiting.   Genitourinary:  Negative for dysuria, frequency and nocturia.   Neurological:  Negative for focal weakness, loss of balance, numbness, paresthesias and seizures.   Psychiatric/Behavioral:  Negative for altered mental status, hallucinations and suicidal ideas.    Allergic/Immunologic: Negative for HIV exposure, hives and persistent infections.           Current Outpatient Medications:     apixaban (ELIQUIS) 5 MG tablet tablet, Take 1 tablet by mouth Every 12 (Twelve) Hours. Indications: Atrial Fibrillation, Disp: 60 tablet, Rfl: 11    etanercept (Enbrel) 50 MG/ML solution prefilled syringe injection, Inject 1 mL under the skin into the appropriate area as directed Every 7 (Seven) Days., Disp: 4 each, Rfl: 4    latanoprost (XALATAN) 0.005 % ophthalmic solution, Administer 1 drop to the right eye Every Night., Disp: , Rfl:     metoprolol tartrate (LOPRESSOR) 25 MG tablet, Take 1 tablet by mouth Every 12 (Twelve) Hours for 360 days., Disp: 60 tablet, Rfl: 11    nitroglycerin (NITROSTAT) 0.4 MG SL tablet, Place 1 tablet under the tongue Every 5 (Five) Minutes As Needed for Chest Pain (Only if SBP Greater Than 100). Take no more than 3 doses in 15 minutes., Disp: 30 tablet, Rfl: 0    pantoprazole (PROTONIX) 40 MG EC tablet, Take 1 tablet by mouth Every Morning., Disp: 30 tablet, Rfl: 0    rosuvastatin (CRESTOR) 20 MG tablet, Take 1 tablet by mouth Every Night., Disp: 30 tablet, Rfl: 0    Objective:   Vitals and nursing note reviewed.   Constitutional:       Appearance: Healthy appearance. Not in distress.   Neck:      Vascular: No JVR. JVD normal.   Pulmonary:      Effort: Pulmonary effort is normal.      Breath sounds: Normal breath sounds. No wheezing. No rhonchi. No rales.   Chest:      Chest wall: Not tender to palpatation.   Cardiovascular:      PMI at  "left midclavicular line. Normal rate. Regular rhythm. Normal S1. Normal S2.       Murmurs: There is no murmur.      No gallop.  No click. No rub.   Pulses:     Intact distal pulses.   Edema:     Peripheral edema absent.   Abdominal:      General: Bowel sounds are normal.      Palpations: Abdomen is soft.      Tenderness: There is no abdominal tenderness.   Musculoskeletal: Normal range of motion.         General: No tenderness. Skin:     General: Skin is warm and dry.   Neurological:      General: No focal deficit present.      Mental Status: Alert and oriented to person, place and time.       Blood pressure 130/70, pulse 62, height 182.9 cm (72\"), weight 106 kg (234 lb), SpO2 97%.   Lab Review:     Assessment:       1. Paroxysmal atrial fibrillation  Asymptomatic.  Rate control and anticoagulation strategy.    2. Primary hypertension  Acceptable blood pressure control.    Procedures    Plan:     Advance Care Planning   ACP discussion was held with the patient during this visit. Patient has an advance directive (not in EMR), copy requested.     No changes in medications warranted at this time.    No further cardiovascular testing indicated.      "

## 2024-11-06 ENCOUNTER — SPECIALTY PHARMACY (OUTPATIENT)
Age: 69
End: 2024-11-06
Payer: MEDICARE

## 2024-11-06 NOTE — PROGRESS NOTES
Specialty Pharmacy Refill Coordination Note     Ashutosh is a 69 y.o. male contacted today regarding refills of  Enbrel specialty medication(s).    Reviewed and verified with patient:  Meds       Specialty medication(s) and dose(s) confirmed: yes    Refill Questions      Flowsheet Row Most Recent Value   Changes to allergies? No   Changes to medications? No   New conditions or infections since last clinic visit No   Unplanned office visit, urgent care, ED, or hospital admission in the last 4 weeks  No   How does patient/caregiver feel medication is working? Good   Financial problems or insurance changes  No   Since the previous refill, were any specialty medication doses or scheduled injections missed or delayed?  No   Does this patient require a clinical escalation to a pharmacist? No            Delivery Questions      Flowsheet Row Most Recent Value   Delivery method UPS   Delivery address verified with patient/caregiver? Yes   Delivery address Home   Number of medications in delivery 1   Medication(s) being filled and delivered Etanercept (Enbrel)   Doses left of specialty medications 2   Copay verified? Yes   Copay amount 0   Copay form of payment No copayment ($0)   Ship Date 11/06   Delivery Date 11/07   Signature Required No                   Follow-up: 21 day(s)     Aniyah Lanier, Pharmacy Technician  Specialty Pharmacy Technician

## 2024-11-27 ENCOUNTER — SPECIALTY PHARMACY (OUTPATIENT)
Age: 69
End: 2024-11-27
Payer: MEDICARE

## 2024-11-27 NOTE — PROGRESS NOTES
Specialty Pharmacy Refill Coordination Note     Ashutosh is a 69 y.o. male contacted today regarding refills of  Enbrel specialty medication(s).    Reviewed and verified with patient: Yes      Specialty medication(s) and dose(s) confirmed: yes    Refill Questions      Flowsheet Row Most Recent Value   Changes to allergies? No   Changes to medications? No   New conditions or infections since last clinic visit No   Unplanned office visit, urgent care, ED, or hospital admission in the last 4 weeks  No   How does patient/caregiver feel medication is working? Good   Financial problems or insurance changes  No   Since the previous refill, were any specialty medication doses or scheduled injections missed or delayed?  No   Does this patient require a clinical escalation to a pharmacist? No            Delivery Questions      Flowsheet Row Most Recent Value   Delivery method UPS   Delivery address verified with patient/caregiver? Yes   Delivery address Home   Number of medications in delivery 1   Medication(s) being filled and delivered Etanercept (Enbrel)   Doses left of specialty medications 2   Copay verified? Yes   Copay amount $0   Copay form of payment No copayment ($0)   Ship Date 12/2/24   Delivery Date 12/3/24   Signature Required No                   Follow-up: 21 day(s)     Alex Mccray, Pharmacy Technician  Specialty Pharmacy Technician

## 2024-12-16 PROBLEM — M1A.09X0 IDIOPATHIC CHRONIC GOUT OF MULTIPLE SITES WITHOUT TOPHUS: Status: ACTIVE | Noted: 2024-12-16

## 2024-12-16 NOTE — PROGRESS NOTES
Office Follow Up      Date: 12/17/2024   Patient Name: Ashutosh Servin  MRN: [id number]  YOB: 1955    Referring Physician: No ref. provider found     Chief Complaint: Psoriatic arthritis      History of Present Illness: Ashutosh Servin is a 69 y.o. male who is here today for follow up on psoriatic arthritis and gout.  Joints are doing well.  No significant pain or swelling.   No gout flares in the interim.    PsA is doing well. No infections or problems with injections.   Skin is doing well.   Had acupuncture and no longer having alpha gal reactions   Pain scale: 4/10. He has no EMS.   The problem has not changed. The following symptoms are not reported:  pain, stiffness, swelling and functional limitation. The patient assesses the interval disease activity as: stable. The patient's assessment of treatment is: helping greatly. The patient is not experiencing side effects. Pertinent negatives include fever, infection, fatigue, AM stiffness, inactivity gelling, change in vision, sicca complaints, mouth sores/lesions, skin lesion(s), chest pain, changing cough, edema, joint swelling and abdominal pain.   Diagnosed with glaucoma. He is on new eyedrops.   Had colonoscopy and had Afib during procedure.   Multiple cardiac tests done.   Now on Eliquis, a statin, and a beta blocker.     Subjective   Review of Systems: Review of Systems   Constitutional:  Negative for chills, fatigue, fever and unexpected weight loss.   HENT:  Negative for dental problem, mouth sores, sinus pressure and swollen glands.    Eyes:  Positive for redness. Negative for photophobia and pain.   Respiratory:  Negative for apnea, cough, chest tightness and shortness of breath.    Cardiovascular:  Positive for palpitations. Negative for chest pain and leg swelling.   Gastrointestinal:  Negative for abdominal pain, diarrhea, nausea and GERD.   Genitourinary:  Negative for dysuria and hematuria.   Skin:  Negative for dry  "skin, rash, skin lesions and bruise.   Neurological:  Negative for dizziness, seizures, syncope, weakness, headache and memory problem.   Hematological:  Negative for adenopathy. Does not bruise/bleed easily.   Psychiatric/Behavioral:  Negative for sleep disturbance, suicidal ideas, depressed mood and stress. The patient is not nervous/anxious.    All other systems reviewed and are negative.       Medications:   Current Outpatient Medications:     apixaban (ELIQUIS) 5 MG tablet tablet, Take 1 tablet by mouth Every 12 (Twelve) Hours. Indications: Atrial Fibrillation, Disp: 60 tablet, Rfl: 11    etanercept (Enbrel) 50 MG/ML solution prefilled syringe injection, Inject 1 mL under the skin into the appropriate area as directed Every 7 (Seven) Days., Disp: 4 each, Rfl: 4    latanoprost (XALATAN) 0.005 % ophthalmic solution, Administer 1 drop to the right eye Every Night., Disp: , Rfl:     metoprolol tartrate (LOPRESSOR) 25 MG tablet, Take 1 tablet by mouth Every 12 (Twelve) Hours for 360 days., Disp: 60 tablet, Rfl: 11    nitroglycerin (NITROSTAT) 0.4 MG SL tablet, Place 1 tablet under the tongue Every 5 (Five) Minutes As Needed for Chest Pain (Only if SBP Greater Than 100). Take no more than 3 doses in 15 minutes., Disp: 30 tablet, Rfl: 0    pantoprazole (PROTONIX) 40 MG EC tablet, Take 1 tablet by mouth Every Morning., Disp: 30 tablet, Rfl: 0    rosuvastatin (CRESTOR) 20 MG tablet, Take 1 tablet by mouth Every Night., Disp: 90 tablet, Rfl: 3    Allergies:   Allergies   Allergen Reactions    Penicillins Provider Review Needed       I have reviewed and updated the patient's chief complaint, history of present illness, review of systems, past medical history, surgical history, family history, social history, medications and allergy list as appropriate.     Objective    Vital Signs:   Vitals:    12/17/24 0950   BP: 118/80   Pulse: 63   Temp: 97.6 °F (36.4 °C)   Weight: 105 kg (232 lb 6.4 oz)   Height: 182.9 cm (72.01\") "   PainSc:   4   PainLoc: Generalized       Body mass index is 31.51 kg/m².    Physical Exam:  GENERAL: The patient is well developed and well nourished.  Cooperative and oriented times three.  Affect is normal.  Hydration appears normal.    HEENT: Normocephalic and atraumatic.  No notable alopecia.  No facial rash.  Lids and conjunctiva are normal.  Pupils are equal and sclera are clear.  I see no oral or nasal ulcers.  Lips, teeth, and gums are within normal limits.  Oropharynx is clear.    NECK: Neck is supple without adenopathy, masses, or thyromegaly.    LUNGS: Effort is normal.  Lungs are clear without rales or rhonchi.    CARDIOVASCULAR: Normal S1, S2.  No murmurs are heard.    ABDOMEN: There are no masses.  The abdomen is non-tender.  I feel no hepatosplenomegaly.    EXTREMITIES: There is no edema.    I see no cyanosis or clubbing.    SKIN: Inspection and palpation are normal.  No rashes are present. Moderate psoriatic lesions on elbows.  NEUROLOGIC: Gait is normal.  MUSCULOSKELETAL: Complete joint exam was performed.  There is full range of motion of the shoulders, elbows, and wrists without notable deformities, soft tissue swelling, synovitis, or atrophy. Severe, nearly mutilans deformities of fingers. Tender on lateral epicondyle. Hips have good flexion and internal and external rotation.  Knees have no palpable effusions.  There is full extension and full flexion of the knees without pain.  Ankles have no soft tissue swelling, synovitis, or major deformities.    BACK: Straight without notable scoliosis.  Joint Exam 12/17/2024     The following joints were examined and normal:   Left Glenohumeral, Right Glenohumeral, Left Elbow, Right Elbow, Left Wrist, Right Wrist, Left MCP 1, Right MCP 1, Left MCP 2, Right MCP 2, Left MCP 3, Right MCP 3, Left MCP 4, Right MCP 4, Left MCP 5, Right MCP 5, Left IP (thumb), Right IP (thumb), Left PIP 2 (finger), Right PIP 2 (finger), Left PIP 3 (finger), Right PIP 3  (finger), Left PIP 4 (finger), Right PIP 4 (finger), Left PIP 5 (finger), Right PIP 5 (finger), Left Knee, Right Knee       Patient Global: 30 / 100  Provider Global: 10 / 100      Assessment / Plan      Assessment & Plan  Psoriatic arthritis  ONSET 1991. MTX CAUSED NAUSEA. ARAVA [id number] (INEFFECTIVE). ENBREL 2002..    Doing well.   No significant pain or swelling.   He has severe deformities, nearly mutilans.  They have been stable for years.  Tender joint count is 0 , swollen joint count is 0 , physician global is 1, visual analog pain scale is 4, pt global is 3.  CDAI is 4-low disease activity. Prognosis is good.    Continue Enbrel and q 4 month f/u and lab.  Psoriasis  Stable with minimal lesions.  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  Allergic reaction to alpha-gal  Better since acupuncture treatment.   Immunosuppression due to drug therapy  Enbrel.  No infections in the interim.  Idiopathic chronic gout of multiple sites without tophus  He declines treatment.  He has done well with diet change. No flares in the interim.     Orders Placed This Encounter   Procedures    CBC Auto Differential    Comprehensive Metabolic Panel    C-reactive Protein    Sedimentation Rate       New Medications Ordered This Visit   Medications    etanercept (Enbrel) 50 MG/ML solution prefilled syringe injection     Sig: Inject 1 mL under the skin into the appropriate area as directed Every 7 (Seven) Days.     Dispense:  4 each     Refill:  4            Follow Up:   Return in about 4 months (around 4/17/2025).        Jeffrey William MD  [facility] [place]

## 2024-12-16 NOTE — ASSESSMENT & PLAN NOTE
ONSET 1991. MTX CAUSED NAUSEA. ARAVA [id number] (INEFFECTIVE). ENBREL 2002..    Doing well.   No significant pain or swelling.   He has severe deformities, nearly mutilans.  They have been stable for years.  Tender joint count is 0 , swollen joint count is 0 , physician global is 1, visual analog pain scale is 4, pt global is 3.  CDAI is 4-low disease activity. Prognosis is good.    Continue Enbrel and q 4 month f/u and lab.

## 2024-12-17 ENCOUNTER — LAB (OUTPATIENT)
Facility: HOSPITAL | Age: 69
End: 2024-12-17
Payer: MEDICARE

## 2024-12-17 ENCOUNTER — OFFICE VISIT (OUTPATIENT)
Age: 69
End: 2024-12-17
Payer: MEDICARE

## 2024-12-17 VITALS
DIASTOLIC BLOOD PRESSURE: 80 MMHG | TEMPERATURE: 97.6 F | WEIGHT: 232.4 LBS | BODY MASS INDEX: 31.48 KG/M2 | HEIGHT: 72 IN | HEART RATE: 63 BPM | SYSTOLIC BLOOD PRESSURE: 118 MMHG

## 2024-12-17 DIAGNOSIS — M19.93 SECONDARY OSTEOARTHRITIS: ICD-10-CM

## 2024-12-17 DIAGNOSIS — L40.9 PSORIASIS: ICD-10-CM

## 2024-12-17 DIAGNOSIS — L40.50 PSORIATIC ARTHRITIS: Primary | ICD-10-CM

## 2024-12-17 DIAGNOSIS — Z79.899 IMMUNOSUPPRESSION DUE TO DRUG THERAPY: ICD-10-CM

## 2024-12-17 DIAGNOSIS — M1A.09X0 IDIOPATHIC CHRONIC GOUT OF MULTIPLE SITES WITHOUT TOPHUS: ICD-10-CM

## 2024-12-17 DIAGNOSIS — T78.1XXA ALLERGIC REACTION TO ALPHA-GAL: ICD-10-CM

## 2024-12-17 DIAGNOSIS — D84.821 IMMUNOSUPPRESSION DUE TO DRUG THERAPY: ICD-10-CM

## 2024-12-17 DIAGNOSIS — L40.50 PSORIATIC ARTHRITIS: ICD-10-CM

## 2024-12-17 LAB
ALBUMIN SERPL-MCNC: 4.5 G/DL (ref 3.5–5.2)
ALBUMIN/GLOB SERPL: 1.6 G/DL
ALP SERPL-CCNC: 78 U/L (ref 39–117)
ALT SERPL W P-5'-P-CCNC: 27 U/L (ref 1–41)
ANION GAP SERPL CALCULATED.3IONS-SCNC: 9 MMOL/L (ref 5–15)
AST SERPL-CCNC: 27 U/L (ref 1–40)
BASOPHILS # BLD AUTO: 0.06 10*3/MM3 (ref 0–0.2)
BASOPHILS NFR BLD AUTO: 0.7 % (ref 0–1.5)
BILIRUB SERPL-MCNC: 0.8 MG/DL (ref 0–1.2)
BUN SERPL-MCNC: 15 MG/DL (ref 8–23)
BUN/CREAT SERPL: 10.8 (ref 7–25)
CALCIUM SPEC-SCNC: 9.6 MG/DL (ref 8.6–10.5)
CHLORIDE SERPL-SCNC: 103 MMOL/L (ref 98–107)
CO2 SERPL-SCNC: 29 MMOL/L (ref 22–29)
CREAT SERPL-MCNC: 1.39 MG/DL (ref 0.76–1.27)
CRP SERPL-MCNC: <0.3 MG/DL (ref 0–0.5)
DEPRECATED RDW RBC AUTO: 39.8 FL (ref 37–54)
EGFRCR SERPLBLD CKD-EPI 2021: 55.2 ML/MIN/1.73
EOSINOPHIL # BLD AUTO: 0.15 10*3/MM3 (ref 0–0.4)
EOSINOPHIL NFR BLD AUTO: 1.7 % (ref 0.3–6.2)
ERYTHROCYTE [DISTWIDTH] IN BLOOD BY AUTOMATED COUNT: 11.7 % (ref 12.3–15.4)
ERYTHROCYTE [SEDIMENTATION RATE] IN BLOOD: 8 MM/HR (ref 0–20)
GLOBULIN UR ELPH-MCNC: 2.9 GM/DL
GLUCOSE SERPL-MCNC: 81 MG/DL (ref 65–99)
HCT VFR BLD AUTO: 51.9 % (ref 37.5–51)
HGB BLD-MCNC: 17.9 G/DL (ref 13–17.7)
IMM GRANULOCYTES # BLD AUTO: 0.04 10*3/MM3 (ref 0–0.05)
IMM GRANULOCYTES NFR BLD AUTO: 0.5 % (ref 0–0.5)
LYMPHOCYTES # BLD AUTO: 2.84 10*3/MM3 (ref 0.7–3.1)
LYMPHOCYTES NFR BLD AUTO: 32.7 % (ref 19.6–45.3)
MCH RBC QN AUTO: 32.1 PG (ref 26.6–33)
MCHC RBC AUTO-ENTMCNC: 34.5 G/DL (ref 31.5–35.7)
MCV RBC AUTO: 93 FL (ref 79–97)
MONOCYTES # BLD AUTO: 0.75 10*3/MM3 (ref 0.1–0.9)
MONOCYTES NFR BLD AUTO: 8.6 % (ref 5–12)
NEUTROPHILS NFR BLD AUTO: 4.85 10*3/MM3 (ref 1.7–7)
NEUTROPHILS NFR BLD AUTO: 55.8 % (ref 42.7–76)
NRBC BLD AUTO-RTO: 0 /100 WBC (ref 0–0.2)
PLATELET # BLD AUTO: 265 10*3/MM3 (ref 140–450)
PMV BLD AUTO: 10.4 FL (ref 6–12)
POTASSIUM SERPL-SCNC: 4.2 MMOL/L (ref 3.5–5.2)
PROT SERPL-MCNC: 7.4 G/DL (ref 6–8.5)
RBC # BLD AUTO: 5.58 10*6/MM3 (ref 4.14–5.8)
SODIUM SERPL-SCNC: 141 MMOL/L (ref 136–145)
WBC NRBC COR # BLD AUTO: 8.69 10*3/MM3 (ref 3.4–10.8)

## 2024-12-17 PROCEDURE — 80053 COMPREHEN METABOLIC PANEL: CPT

## 2024-12-17 PROCEDURE — 85025 COMPLETE CBC W/AUTO DIFF WBC: CPT

## 2024-12-17 PROCEDURE — 85652 RBC SED RATE AUTOMATED: CPT

## 2024-12-17 PROCEDURE — 86140 C-REACTIVE PROTEIN: CPT

## 2024-12-17 PROCEDURE — 36415 COLL VENOUS BLD VENIPUNCTURE: CPT

## 2024-12-17 RX ORDER — ETANERCEPT 50 MG/ML
50 SOLUTION SUBCUTANEOUS
Qty: 4 EACH | Refills: 4 | Status: SHIPPED | OUTPATIENT
Start: 2024-12-17 | End: 2024-12-19 | Stop reason: SDUPTHER

## 2024-12-19 RX ORDER — ETANERCEPT 50 MG/ML
50 SOLUTION SUBCUTANEOUS
Qty: 4 EACH | Refills: 4 | Status: SHIPPED | OUTPATIENT
Start: 2024-12-19

## 2024-12-19 NOTE — TELEPHONE ENCOUNTER
Specialty Pharmacy Patient Management Program  Per Protocol Prescription Order/Refill     Patient currently fills medications at [facility] and is enrolled in an Rheumatology Patient Management Program.     Requested Prescriptions     Signed Prescriptions Disp Refills    etanercept (Enbrel) 50 MG/ML solution prefilled syringe injection 4 each 4     Sig: Inject 1 mL under the skin into the appropriate area as directed Every 7 (Seven) Days.     Authorizing Provider: ERNESTO ADAM     Ordering User: DEBI RAINES     Prescription orders above were sent to the pharmacy per Collaborative Care Agreement Protocol.

## 2024-12-23 ENCOUNTER — SPECIALTY PHARMACY (OUTPATIENT)
Age: 69
End: 2024-12-23
Payer: MEDICARE

## 2024-12-23 NOTE — PROGRESS NOTES
Specialty Pharmacy Patient Management Program  Rheumatology Refill Outreach      Ashutosh is a 69 y.o. male contacted today regarding refills of his medication(s) Enbrel.    Specialty medication(s) and dose(s) confirmed: yes    Refill Questions      Flowsheet Row Most Recent Value   Changes to allergies? No   Changes to medications? No   New conditions or infections since last clinic visit No   Unplanned office visit, urgent care, ED, or hospital admission in the last 4 weeks  No   How does patient/caregiver feel medication is working? Good   Financial problems or insurance changes  No   Since the previous refill, were any specialty medication doses or scheduled injections missed or delayed?  No   Does this patient require a clinical escalation to a pharmacist? No          Delivery Questions      Flowsheet Row Most Recent Value   Delivery method UPS   Delivery address verified with patient/caregiver? Yes   Delivery address Home   Number of medications in delivery 1   Medication(s) being filled and delivered Etanercept (Enbrel)   Doses left of specialty medications 3   Copay verified? Yes   Copay amount $0   Copay form of payment No copayment ($0)   Ship Date 12.26   Delivery Date 12.27   Signature Required No            Follow-Up: 21 days    Elana Garsia PharmD, BCPS  Clinical Specialty Pharmacist, Rheumatology   12/23/2024  11:05 EST

## 2025-02-03 ENCOUNTER — SPECIALTY PHARMACY (OUTPATIENT)
Age: 70
End: 2025-02-03
Payer: MEDICARE

## 2025-02-03 NOTE — PROGRESS NOTES
Specialty Pharmacy Refill Coordination Note     Ashutosh is a 69 y.o. male contacted today regarding refills of  Enbrel specialty medication(s).    Reviewed and verified with patient: Yes      Specialty medication(s) and dose(s) confirmed: yes    Refill Questions      Flowsheet Row Most Recent Value   Changes to allergies? No   Changes to medications? No   New conditions or infections since last clinic visit No   Unplanned office visit, urgent care, ED, or hospital admission in the last 4 weeks  No   How does patient/caregiver feel medication is working? Good   Financial problems or insurance changes  No   Since the previous refill, were any specialty medication doses or scheduled injections missed or delayed?  No   Does this patient require a clinical escalation to a pharmacist? No            Delivery Questions      Flowsheet Row Most Recent Value   Delivery method UPS   Delivery address verified with patient/caregiver? Yes   Delivery address Home   Number of medications in delivery 1   Medication(s) being filled and delivered Etanercept (Enbrel)   Doses left of specialty medications 2   Copay verified? Yes   Copay amount $55 - patient gave me new cc that's on file   Copay form of payment Credit/debit on file   Ship Date 2/5/25   Delivery Date Selection 02/06/25   Signature Required No                   Follow-up: 21 day(s)     Alex Mccray CPhT-Adv  Pharmacy Care Coordinator, Rheumatology

## 2025-02-27 ENCOUNTER — TELEPHONE (OUTPATIENT)
Age: 70
End: 2025-02-27
Payer: MEDICARE

## 2025-02-27 NOTE — TELEPHONE ENCOUNTER
Prior authorization initiated by [facility].  Update will be provided when a determination has been received.     Medication: Enbrel  PA Submission Method: CMM  Case Number/CMM Key: [id number]

## 2025-03-03 ENCOUNTER — SPECIALTY PHARMACY (OUTPATIENT)
Age: 70
End: 2025-03-03
Payer: MEDICARE

## 2025-03-03 NOTE — PROGRESS NOTES
Specialty Pharmacy Patient Management Program  Rheumatology Reassessment     Ashutosh Servin was referred by an Rheumatology provider to the Rheumatology Patient Management program offered by [facility] for Psoriatic Arthritis. A follow-up outreach was conducted, including assessment of continued therapy appropriateness, medication adherence, and side effect incidence and management for Enbrel.    Changes to Insurance Coverage or Financial Support  HUmana D    Relevant Past Medical History and Comorbidities  Relevant medical history and concomitant health conditions were discussed with the patient. The patient's chart has been reviewed for relevant past medical history and comorbid health conditions and updated as necessary.   Past Medical History:   Diagnosis Date    A-fib     Bronchitis     Epicondylitis, lateral     LEFT    High risk medication use     Immunosuppression     Nephrolithiasis     Odynophagia     Psoriasis     Psoriatic arthritis     Secondary osteoarthritis      Social History     Socioeconomic History    Marital status:    Tobacco Use    Smoking status: Never     Passive exposure: Never    Smokeless tobacco: Never   Vaping Use    Vaping status: Never Used   Substance and Sexual Activity    Alcohol use: Not Currently    Drug use: Never    Sexual activity: Defer     Problem list reviewed by Elana Garsia PharmD on 3/3/2025 at 11:15 AM    Hospitalizations and Urgent Care Since Last Assessment  ED Visits, Admissions, or Hospitalizations: none  Urgent Office Visits: none    Allergies  Known allergies and reactions were discussed with the patient. The patient's chart has been reviewed for allergy information and updated as necessary.   Allergies   Allergen Reactions    Penicillins Provider Review Needed     Allergies reviewed by Elana Garsia PharmD on 3/3/2025 at 11:14 AM    Relevant Laboratory Values  Relevant laboratory values were discussed with the patient. The  following specialty medication dose adjustment(s) are recommended:   A1C Last 3 Results          9/4/2024    15:01   HGBA1C Last 3 Results   Hemoglobin A1C 5.50      Lab Results   Component Value Date    HGBA1C 5.50 09/04/2024     Lab Results   Component Value Date    GLUCOSE 81 12/17/2024    CALCIUM 9.6 12/17/2024     12/17/2024    K 4.2 12/17/2024    CO2 29.0 12/17/2024     12/17/2024    BUN 15 12/17/2024    CREATININE 1.39 (H) 12/17/2024    BCR 10.8 12/17/2024    ANIONGAP 9.0 12/17/2024     Lab Results   Component Value Date    CHOL 123 09/04/2024    TRIG 124 09/04/2024    HDL 37 (L) 09/04/2024    LDL 64 09/04/2024       Current Medication List  This medication list has been reviewed with the patient and evaluated for any interactions or necessary modifications/recommendations, and updated to include all prescription medications, OTC medications, and supplements the patient is currently taking.  This list reflects what is contained in the patient's profile, which has also been marked as reviewed to communicate to other providers it is the most up to date version of the patient's current medication therapy.     Current Outpatient Medications:     apixaban (ELIQUIS) 5 MG tablet tablet, Take 1 tablet by mouth Every 12 (Twelve) Hours. Indications: Atrial Fibrillation, Disp: 60 tablet, Rfl: 11    etanercept (Enbrel) 50 MG/ML solution prefilled syringe injection, Inject 1 mL under the skin into the appropriate area as directed Every 7 (Seven) Days., Disp: 4 each, Rfl: 4    latanoprost (XALATAN) 0.005 % ophthalmic solution, Administer 1 drop to the right eye Every Night., Disp: , Rfl:     metoprolol tartrate (LOPRESSOR) 25 MG tablet, Take 1 tablet by mouth Every 12 (Twelve) Hours for 360 days., Disp: 60 tablet, Rfl: 11    nitroglycerin (NITROSTAT) 0.4 MG SL tablet, Place 1 tablet under the tongue Every 5 (Five) Minutes As Needed for Chest Pain (Only if SBP Greater Than 100). Take no more than 3 doses in 15  minutes., Disp: 30 tablet, Rfl: 0    pantoprazole (PROTONIX) 40 MG EC tablet, Take 1 tablet by mouth Every Morning., Disp: 30 tablet, Rfl: 0    rosuvastatin (CRESTOR) 20 MG tablet, Take 1 tablet by mouth Every Night., Disp: 90 tablet, Rfl: 3    Medicines reviewed by Elana Gasria, PharmD on 3/3/2025 at 11:14 AM    Drug Interactions  No medication interactions    Adverse Drug Reactions  Medication tolerability: Tolerating with no to minimal ADRs  Medication plan: Continue therapy with normal follow-up  Plan for ADR Management: no ADRs    Adherence, Self-Administration, and Current Therapy Problems  Adherence related to the patient's specialty therapy was discussed with the patient. The Adherence segment of this outreach has been reviewed and updated.     Adherence Questions  Linked Medication(s) Assessed: Etanercept (Enbrel)  On average, how many doses/injections does the patient miss per month?: 0  What are the identified reasons for non-adherence or missed doses? : no problems identified  What is the estimated medication adherence level?: %  Based on the patient/caregiver response and refill history, does this patient require an MTP to track adherence improvements?: no    Additional Barriers to Patient Self-Administration: no  Methods for Supporting Patient Self-Administration: no    Open Medication Therapy Problems  No medication therapy recommendations to display    Goals of Therapy  Goals related to the patient's specialty therapy were discussed with the patient. The Patient Goals segment of this outreach has been reviewed and updated.   Goals Addressed Today        Specialty Pharmacy General Goal      Reduce number of flares and pain score.     3.3.25: Less pain and flares              Quality of Life Assessment   Quality of Life related to the patient's enrollment in the patient management program and services provided was discussed with the patient. The QOL segment of this outreach has been reviewed  and updated.  Quality of Life Improvement Scale: 8-Moderately better    Reassessment Plan & Follow-Up  1. Medication Therapy Changes: Enbrel 50mg every 7 days2  2. Related Plans, Therapy Recommendations, or Issues to Be Addressed: no questions or concerns at this time  3. Pharmacist to perform regular assessments no more than (6) months from the previous assessment.  4. Care Coordinator to set up future refill outreaches, coordinate prescription delivery, and escalate clinical questions to pharmacist.    Attestation  Therapeutic appropriateness: Appropriate   I attest the patient was actively involved in and has agreed to the above plan of care.  If the prescribed therapy is at any point deemed not appropriate based on the current or future assessments, a consultation will be initiated with the patient's specialty care provider to determine the best course of action. The revised plan of therapy will be documented along with any required assessments and/or additional patient education provided.     Elana Garsia, PharmD, BCPS  Clinical Specialty Pharmacist, Rheumatology   3/3/2025  11:16 EST

## 2025-04-02 PROBLEM — R53.83 FATIGUE: Status: ACTIVE | Noted: 2025-04-02

## 2025-04-02 NOTE — PROGRESS NOTES
Office Follow Up      Date: 04/17/2025   Patient Name: Ashutosh Servin  MRN: [id number]  YOB: 1955    Referring Physician: No ref. provider found     Chief Complaint: Psoriatic arthritis      History of Present Illness: Ashutosh Servin is a 69 y.o. male who is here today for follow up on psoriatic arthritis and gout.    He is doing worse.   He has been spacing out his Enbrel as it has been denied by his insurance. He does feel his joints and skin are doing worse with decreasing Enbrel doses. His last dose was 2 weeks ago.  No gout flares in the interim.   Had acupuncture and no longer having alpha gal reactions   Pain scale: 6/10. He has no EMS.     Subjective   Review of Systems: Negative per patient    Medications:   Current Outpatient Medications:     apixaban (ELIQUIS) 5 MG tablet tablet, Take 1 tablet by mouth Every 12 (Twelve) Hours. Indications: Atrial Fibrillation, Disp: 60 tablet, Rfl: 11    etanercept (Enbrel) 50 MG/ML solution prefilled syringe injection, Inject 1 mL under the skin into the appropriate area as directed Every 7 (Seven) Days., Disp: 4 each, Rfl: 4    latanoprost (XALATAN) 0.005 % ophthalmic solution, Administer 1 drop to the right eye Every Night., Disp: , Rfl:     metoprolol tartrate (LOPRESSOR) 25 MG tablet, Take 1 tablet by mouth Every 12 (Twelve) Hours for 360 days., Disp: 60 tablet, Rfl: 11    nitroglycerin (NITROSTAT) 0.4 MG SL tablet, Place 1 tablet under the tongue Every 5 (Five) Minutes As Needed for Chest Pain (Only if SBP Greater Than 100). Take no more than 3 doses in 15 minutes., Disp: 30 tablet, Rfl: 0    pantoprazole (PROTONIX) 40 MG EC tablet, Take 1 tablet by mouth Every Morning., Disp: 30 tablet, Rfl: 0    rosuvastatin (CRESTOR) 20 MG tablet, Take 1 tablet by mouth Every Night., Disp: 90 tablet, Rfl: 3    Allergies:   Allergies   Allergen Reactions    Penicillins Provider Review Needed       I have reviewed and updated the patient's chief  "complaint, history of present illness, review of systems, past medical history, surgical history, family history, social history, medications and allergy list as appropriate.     Objective    Vital Signs:   Vitals:    04/17/25 0902   BP: 122/80   BP Location: Left arm   Patient Position: Sitting   Cuff Size: Adult   Pulse: 56   Temp: 97.7 °F (36.5 °C)   Weight: 105 kg (232 lb)   Height: 182.9 cm (72.01\")   PainSc: 6    PainLoc: Generalized     Body mass index is 31.46 kg/m².  Defer to PCP    Physical Exam:  GENERAL: The patient is well developed and well nourished.  Cooperative and oriented times three.  Affect is normal.  Hydration appears normal.    HEENT: Normocephalic and atraumatic.  No notable alopecia.  No facial rash.  Lids and conjunctiva are normal.  Pupils are equal and sclera are clear.  I see no oral or nasal ulcers.  Lips, teeth, and gums are within normal limits.  Oropharynx is clear.    NECK: Neck is supple without adenopathy, masses, or thyromegaly.    LUNGS: Effort is normal.    CARDIOVASCULAR: Normal rate and rhythm.    ABDOMEN: Not examined.  EXTREMITIES: There is no edema.    I see no cyanosis or clubbing.    SKIN: Inspection and palpation are normal.  No rashes are present. Moderate psoriatic lesions on elbows.  NEUROLOGIC: Gait is normal.  MUSCULOSKELETAL: Complete joint exam was performed.  There is full range of motion of the shoulders, elbows, and wrists without notable deformities, soft tissue swelling, synovitis, or atrophy. Severe, nearly mutilans deformities of some fingers. Knees have no palpable effusions.  There is full extension and full flexion of the knees without pain.  Ankles have no soft tissue swelling, synovitis, or major deformities.    BACK: Straight without notable scoliosis.    Assessment / Plan      Assessment & Plan  Psoriatic arthritis  ONSET 1991. MTX CAUSED NAUSEA. ARAVA [id number] (INEFFECTIVE). ENBREL [id number].    He has severe deformities, nearly mutilans. They " have been stable for years.  Tender joint count is 0 , swollen joint count is 0, physician global is 1, visual analog pain scale is 6, pt global is 6.  CDAI is 7-low disease activity. Prognosis is good.    Unfortunately, insurance will no longer cover Enbrel, despite him having been on this medication for 13 years and it working well for him.  Medications they will approve include Humira, Skyrizi, Cosentyx, Rinvoq, Stelara, or Tremfya.  We will PA Humira.   4 sample doses of Enbrel were given to him today to use until new medication is approved.  Continue 4 month f/u and labs  Immunosuppression due to drug therapy  PA Marisol    No infections in the interim  Psoriasis  Worse with Enbrel being used less often.  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  Idiopathic chronic gout of multiple sites without tophus  He declines treatment.  He has done well with diet change. No flares in the interim.   Allergic reaction to alpha-gal  Better since acupuncture treatment.   Fatigue, unspecified type  Update QTB and hepatitis panel today    Follow Up:   Return in about 4 months (around 8/17/2025) for Dr. William.      SERVANDO Gutierrez  [facility] [place]

## 2025-04-02 NOTE — ASSESSMENT & PLAN NOTE
ONSET 1991. MTX CAUSED NAUSEA. ARAVA [id number] (INEFFECTIVE). ENBREL [id number].    He has severe deformities, nearly mutilans. They have been stable for years.  Tender joint count is 0 , swollen joint count is 0, physician global is 1, visual analog pain scale is 6, pt global is 6.  CDAI is 7-low disease activity. Prognosis is good.    Unfortunately, insurance will no longer cover Enbrel, despite him having been on this medication for 13 years and it working well for him.  Medications they will approve include Humira, Skyrizi, Cosentyx, Rinvoq, Stelara, or Tremfya.  We will PA Humira.   4 sample doses of Enbrel were given to him today to use until new medication is approved.  Continue 4 month f/u and labs

## 2025-04-14 ENCOUNTER — SPECIALTY PHARMACY (OUTPATIENT)
Age: 70
End: 2025-04-14
Payer: MEDICARE

## 2025-04-17 ENCOUNTER — SPECIALTY PHARMACY (OUTPATIENT)
Age: 70
End: 2025-04-17
Payer: MEDICARE

## 2025-04-17 ENCOUNTER — LAB (OUTPATIENT)
Facility: HOSPITAL | Age: 70
End: 2025-04-17
Payer: MEDICARE

## 2025-04-17 ENCOUNTER — OFFICE VISIT (OUTPATIENT)
Age: 70
End: 2025-04-17
Payer: MEDICARE

## 2025-04-17 ENCOUNTER — TELEPHONE (OUTPATIENT)
Age: 70
End: 2025-04-17

## 2025-04-17 VITALS
TEMPERATURE: 97.7 F | HEART RATE: 56 BPM | HEIGHT: 72 IN | SYSTOLIC BLOOD PRESSURE: 122 MMHG | BODY MASS INDEX: 31.42 KG/M2 | WEIGHT: 232 LBS | DIASTOLIC BLOOD PRESSURE: 80 MMHG

## 2025-04-17 DIAGNOSIS — L40.50 PSORIATIC ARTHRITIS: Primary | ICD-10-CM

## 2025-04-17 DIAGNOSIS — M1A.09X0 IDIOPATHIC CHRONIC GOUT OF MULTIPLE SITES WITHOUT TOPHUS: ICD-10-CM

## 2025-04-17 DIAGNOSIS — M19.93 SECONDARY OSTEOARTHRITIS: ICD-10-CM

## 2025-04-17 DIAGNOSIS — D84.821 IMMUNOSUPPRESSION DUE TO DRUG THERAPY: ICD-10-CM

## 2025-04-17 DIAGNOSIS — T78.1XXA ALLERGIC REACTION TO ALPHA-GAL: ICD-10-CM

## 2025-04-17 DIAGNOSIS — Z79.899 IMMUNOSUPPRESSION DUE TO DRUG THERAPY: ICD-10-CM

## 2025-04-17 DIAGNOSIS — R53.83 FATIGUE, UNSPECIFIED TYPE: ICD-10-CM

## 2025-04-17 DIAGNOSIS — L40.9 PSORIASIS: ICD-10-CM

## 2025-04-17 DIAGNOSIS — L40.50 PSORIATIC ARTHRITIS: ICD-10-CM

## 2025-04-17 LAB
ALBUMIN SERPL-MCNC: 4.4 G/DL (ref 3.5–5.2)
ALBUMIN/GLOB SERPL: 1.5 G/DL
ALP SERPL-CCNC: 87 U/L (ref 39–117)
ALT SERPL W P-5'-P-CCNC: 27 U/L (ref 1–41)
ANION GAP SERPL CALCULATED.3IONS-SCNC: 10.7 MMOL/L (ref 5–15)
AST SERPL-CCNC: 29 U/L (ref 1–40)
BILIRUB SERPL-MCNC: 0.9 MG/DL (ref 0–1.2)
BUN SERPL-MCNC: 15 MG/DL (ref 8–23)
BUN/CREAT SERPL: 10.3 (ref 7–25)
CALCIUM SPEC-SCNC: 9.7 MG/DL (ref 8.6–10.5)
CHLORIDE SERPL-SCNC: 107 MMOL/L (ref 98–107)
CO2 SERPL-SCNC: 27.3 MMOL/L (ref 22–29)
CREAT SERPL-MCNC: 1.46 MG/DL (ref 0.76–1.27)
CRP SERPL-MCNC: <0.3 MG/DL (ref 0–0.5)
DEPRECATED RDW RBC AUTO: 42.8 FL (ref 37–54)
EGFRCR SERPLBLD CKD-EPI 2021: 52.1 ML/MIN/1.73
ERYTHROCYTE [DISTWIDTH] IN BLOOD BY AUTOMATED COUNT: 12.5 % (ref 12.3–15.4)
ERYTHROCYTE [SEDIMENTATION RATE] IN BLOOD: 12 MM/HR (ref 0–20)
GLOBULIN UR ELPH-MCNC: 3 GM/DL
GLUCOSE SERPL-MCNC: 87 MG/DL (ref 65–99)
HAV IGM SERPL QL IA: NORMAL
HBV CORE IGM SERPL QL IA: NORMAL
HBV SURFACE AG SERPL QL IA: NORMAL
HCT VFR BLD AUTO: 54.9 % (ref 37.5–51)
HCV AB SER QL: NORMAL
HGB BLD-MCNC: 18.6 G/DL (ref 13–17.7)
MCH RBC QN AUTO: 32 PG (ref 26.6–33)
MCHC RBC AUTO-ENTMCNC: 33.9 G/DL (ref 31.5–35.7)
MCV RBC AUTO: 94.5 FL (ref 79–97)
PLATELET # BLD AUTO: 226 10*3/MM3 (ref 140–450)
PMV BLD AUTO: 10.8 FL (ref 6–12)
POTASSIUM SERPL-SCNC: 4.5 MMOL/L (ref 3.5–5.2)
PROT SERPL-MCNC: 7.4 G/DL (ref 6–8.5)
RBC # BLD AUTO: 5.81 10*6/MM3 (ref 4.14–5.8)
SODIUM SERPL-SCNC: 145 MMOL/L (ref 136–145)
WBC NRBC COR # BLD AUTO: 8.26 10*3/MM3 (ref 3.4–10.8)

## 2025-04-17 PROCEDURE — 80053 COMPREHEN METABOLIC PANEL: CPT

## 2025-04-17 PROCEDURE — 86480 TB TEST CELL IMMUN MEASURE: CPT

## 2025-04-17 PROCEDURE — 36415 COLL VENOUS BLD VENIPUNCTURE: CPT

## 2025-04-17 PROCEDURE — 85027 COMPLETE CBC AUTOMATED: CPT

## 2025-04-17 PROCEDURE — 80074 ACUTE HEPATITIS PANEL: CPT

## 2025-04-17 PROCEDURE — 86140 C-REACTIVE PROTEIN: CPT

## 2025-04-17 PROCEDURE — 85652 RBC SED RATE AUTOMATED: CPT

## 2025-04-17 RX ORDER — ADALIMUMAB 40MG/0.4ML
1 KIT SUBCUTANEOUS
COMMUNITY

## 2025-04-17 NOTE — TELEPHONE ENCOUNTER
Prior authorization initiated by [facility].  Update will be provided when a determination has been received.     Medication: Humira  PA Submission Method: CMM  Case Number/CMM Key: [id number]

## 2025-04-19 LAB
GAMMA INTERFERON BACKGROUND BLD IA-ACNC: 0.02 IU/ML
M TB IFN-G BLD-IMP: NEGATIVE
M TB IFN-G CD4+ BCKGRND COR BLD-ACNC: 0.02 IU/ML
M TB IFN-G CD4+CD8+ BCKGRND COR BLD-ACNC: 0.01 IU/ML
MITOGEN IGNF BCKGRD COR BLD-ACNC: >10 IU/ML
QUANTIFERON INCUBATION: NORMAL
SERVICE CMNT-IMP: NORMAL

## 2025-04-22 ENCOUNTER — SPECIALTY PHARMACY (OUTPATIENT)
Facility: HOSPITAL | Age: 70
End: 2025-04-22
Payer: MEDICARE

## 2025-04-22 RX ORDER — ADALIMUMAB 40MG/0.4ML
1 KIT SUBCUTANEOUS
Qty: 2 EACH | Refills: 5 | Status: SHIPPED | OUTPATIENT
Start: 2025-04-22

## 2025-04-22 NOTE — PROGRESS NOTES
Specialty Pharmacy Patient Management Program  Per Protocol Prescription Order/Refill     Patient currently fills medications at [facility] and is enrolled in an Rheumatology Patient Management Program.     Requested Prescriptions     Signed Prescriptions Disp Refills    Adalimumab (Humira, 2 Pen,) 40 MG/0.4ML Auto-injector Kit 2 each 5     Sig: Inject 1 Pen under the skin into the appropriate area as directed Every 14 (Fourteen) Days.     Prescription orders above were sent to the pharmacy per Collaborative Care Agreement Protocol.

## 2025-04-23 ENCOUNTER — SPECIALTY PHARMACY (OUTPATIENT)
Facility: HOSPITAL | Age: 70
End: 2025-04-23
Payer: MEDICARE

## 2025-04-23 NOTE — PROGRESS NOTES
Specialty Pharmacy Patient Management Program  Rheumatology Initial Assessment     Ashutosh Servin was referred by an Rheumatology provider to the Rheumatology Patient Management program offered by [facility] for Psoriatic Arthritis on 04/23/25.  An initial outreach was conducted, including assessment of therapy appropriateness and specialty medication education for Humira. The patient was introduced to services offered by [facility], including: regular assessments, refill coordination, curbside pick-up or mail order delivery options, prior authorization maintenance, and financial assistance programs as applicable. The patient was also provided with contact information for the pharmacy team.     Insurance Coverage & Financial Support  [employer]     Relevant Past Medical History and Comorbidities  Relevant medical history and concomitant health conditions were discussed with the patient. The patient's chart has been reviewed for relevant past medical history and comorbid conditions and updated as necessary.  Past Medical History:   Diagnosis Date    A-fib     Bronchitis     Epicondylitis, lateral     LEFT    High risk medication use     Immunosuppression     Nephrolithiasis     Odynophagia     Psoriasis     Psoriatic arthritis     Secondary osteoarthritis      Social History     Socioeconomic History    Marital status:    Tobacco Use    Smoking status: Never     Passive exposure: Past    Smokeless tobacco: Never   Vaping Use    Vaping status: Never Used   Substance and Sexual Activity    Alcohol use: Not Currently    Drug use: Never    Sexual activity: Defer       Problem list reviewed by Elana Garsia, PharmD on 4/23/2025 at  8:28 AM    Allergies  Known allergies and reactions were discussed with the patient. The patient's chart has been reviewed for  allergy information and updated as necessary.   Allergies   Allergen Reactions    Penicillins Provider Review  Needed       Allergies reviewed by Elana Garsia, Peyton on 4/23/2025 at  8:27 AM    Relevant Laboratory Values  Relevant laboratory values were discussed with the patient. The following specialty medication dose adjustment(s) are recommended:   A1C Last 3 Results          9/4/2024    15:01   HGBA1C Last 3 Results   Hemoglobin A1C 5.50      Lab Results   Component Value Date    HGBA1C 5.50 09/04/2024     Lab Results   Component Value Date    GLUCOSE 87 04/17/2025    CALCIUM 9.7 04/17/2025     04/17/2025    K 4.5 04/17/2025    CO2 27.3 04/17/2025     04/17/2025    BUN 15 04/17/2025    CREATININE 1.46 (H) 04/17/2025    BCR 10.3 04/17/2025    ANIONGAP 10.7 04/17/2025     Lab Results   Component Value Date    CHOL 123 09/04/2024    TRIG 124 09/04/2024    HDL 37 (L) 09/04/2024    LDL 64 09/04/2024         Current Medication List  This medication list has been reviewed with the patient and evaluated for any interactions or necessary modifications/recommendations, and updated to include all prescription medications, OTC medications, and supplements the patient is currently taking.  This list reflects what is contained in the patient's profile, which has also been marked as reviewed to communicate to other providers it is the most up to date version of the patient's current medication therapy.     Current Outpatient Medications:     Adalimumab (Humira, 2 Pen,) 40 MG/0.4ML Auto-injector Kit, Inject 1 Pen under the skin into the appropriate area as directed Every 14 (Fourteen) Days., Disp: 2 each, Rfl: 5    apixaban (ELIQUIS) 5 MG tablet tablet, Take 1 tablet by mouth Every 12 (Twelve) Hours. Indications: Atrial Fibrillation, Disp: 60 tablet, Rfl: 11    etanercept (Enbrel) 50 MG/ML solution prefilled syringe injection, Inject 1 mL under the skin into the appropriate area as directed Every 7 (Seven) Days., Disp: 4 each, Rfl: 4    latanoprost (XALATAN) 0.005 % ophthalmic solution, Administer 1 drop to the right  eye Every Night., Disp: , Rfl:     metoprolol tartrate (LOPRESSOR) 25 MG tablet, Take 1 tablet by mouth Every 12 (Twelve) Hours for 360 days., Disp: 60 tablet, Rfl: 11    nitroglycerin (NITROSTAT) 0.4 MG SL tablet, Place 1 tablet under the tongue Every 5 (Five) Minutes As Needed for Chest Pain (Only if SBP Greater Than 100). Take no more than 3 doses in 15 minutes., Disp: 30 tablet, Rfl: 0    pantoprazole (PROTONIX) 40 MG EC tablet, Take 1 tablet by mouth Every Morning., Disp: 30 tablet, Rfl: 0    rosuvastatin (CRESTOR) 20 MG tablet, Take 1 tablet by mouth Every Night., Disp: 90 tablet, Rfl: 3    Medicines reviewed by Elana Garsia, PharmD on 4/23/2025 at  8:27 AM    Drug Interactions  No medication interactions    Initial Education Provided for Specialty Medication  The patient has been provided with the following education and any applicable administration techniques (i.e. self-injection) have been demonstrated for the therapies indicated. All questions and concerns have been addressed prior to the patient receiving the medication, and the patient has verbalized comprehension of the education and any materials provided. Additional patient education shall be provided and documented upon request by the patient, provider, or payer.       Humira (adalimumab)         Medication Expectations   Why am I taking this medication? You are taking this medication for Crohn's disease, ulcerative colitis, rheumatoid, ankylosing spondylitis or psoriatic arthritis.   What should I expect while on this medication? You should expect a decrease in the frequency and severity of symptoms.   How does the medication work? Adalimumab binds to TNF-alpha therefore interfering with binding to a TNFa receptor site.   How long will I be on this medication for? The amount of time you will be on this medication will be determined by your doctor and your response to the medication.    How do I take this medication? Take as directed on your  prescription label.  This medication is a subcutaneous injection given in the fatty part of the skin on the top of the thigh or stomach area. May leave at room temperature for 15-30 minutes prior to injection.   What are some possible side effects? Injection site reactions and hypersensitivity reactions, headache, signs of a common cold, stomach pain, upset stomach, or back pain.   What happens if I miss a dose? If you miss a dose, take it as soon as you remember. If it is close to the time for your next dose, skip the missed dose and go back to your normal time.               Medication Safety   What are things I should warn my doctor immediately about? Allergic reaction such has hives or trouble breathing. If you develop symptoms such as a cough that does not go away, weight loss, changes in how often you urinate, numbness or tingling in extremities, rash on cheeks or other body parts, unusual bleeding or bruising.   What are things that I should be cautious of? Injection site reaction, back pain, and headache. You may have more chance of getting an infection.  Wash your hands often and stay away from people with infections, colds, or flu.   What are some medications that can interact with this one? Immunosuppressants and vaccines.            Medication Storage/Handling   How should I handle this medication? Keep this medication our of reach of pets/children in original container.  Store in the original container to protect from light. Do not inject where the skin is tender, bruised, red, hard, or affected by psoriasis.  Rotate injection sites.   How does this medication need to be stored? Store in refrigerator and keep dry. If needed, you may store at room temperature for up to 14 days, but do not return to the refrigerator after it has reached room temperature.    How should I dispose of this medication? You can dispose of the empty syringe in a sharps container, and if this is not available you may use an empty  hard plastic container such as a milk jug or tide container.            Resources/Support   How can I remind myself to take this medication? You can download a reminder duke on your phone or use a calandar  to help with your injection.   Is financial support available?  Yes, [employer] can provide co-pay cards if you have commercial insurance or patient assistance if you have Medicare or no insurance.    Which vaccines are recommended for me? Talk to your doctor about these vaccines: Flu, Coronavirus (COVID-19), Pneumococcal (pneumonia), Tdap, Hepatitis B, Zoster (shingles).              Adherence and Self-Administration  Adherence related to the patient's specialty therapy was discussed with the patient. The Adherence segment of this outreach has been reviewed and updated.     Is there a concern with patient's ability to self administer the medication correctly and without issue?: No  Were any potential barriers to adherence identified during the initial assessment or patient education?: No  Are there any concerns regarding the patient's understanding of the importance of medication adherence?: No  Methods for Supporting Patient Adherence and/or Self-Administration: no    Open Medication Therapy Problems  No medication therapy recommendations to display    Goals of Therapy  Goals related to the patient's specialty therapy were discussed with the patient. The Patient Goals segment of this outreach has been reviewed and updated.   Goals Addressed Today        Specialty Pharmacy General Goal      Reduce number of flares and pain score.     3.3.25: Less pain and flares  4.23.25: Changed to Enbrel (due to insurance)            Reassessment Plan & Follow-Up  1. Medication Therapy Changes: Humira 40mg every 14 days  2. Related Plans, Therapy Recommendations, or Therapy Problems to Be Addressed: no questions or concerns at this time  3. Pharmacist to perform regular assessments no more than (6) months from the previous  assessment.  4. Care Coordinator to set up future refill outreaches, coordinate prescription delivery, and escalate clinical questions to pharmacist.  5. Welcome information and patient satisfaction survey to be sent by specialty pharmacy team with patient's initial fill.    Attestation  Therapeutic appropriateness: Appropriate   I attest the patient was actively involved in and has agreed to the above plan of care. If the prescribed therapy is at any point deemed not appropriate based on the current or future assessments, a consultation will be initiated with the patient's specialty care provider to determine the best course of action. The revised plan of therapy will be documented along with any required assessments and/or additional patient education provided.     Elana Garsia, PharmD, BCPS  Clinical Specialty Pharmacist, Rheumatology   4/23/2025  08:29 EDT

## 2025-05-19 ENCOUNTER — SPECIALTY PHARMACY (OUTPATIENT)
Age: 70
End: 2025-05-19
Payer: MEDICARE

## 2025-05-19 NOTE — PROGRESS NOTES
Specialty Pharmacy Patient Management Program  Refill Outreach     Ashutosh was contacted today regarding refills of their medication(s). HUMIRA    Refill Questions      Flowsheet Row Most Recent Value   Changes to allergies? No   Changes to medications? No   New conditions or infections since last clinic visit No   Unplanned office visit, urgent care, ED, or hospital admission in the last 4 weeks  No   How does patient/caregiver feel medication is working? Fair   Financial problems or insurance changes  No   Since the previous refill, were any specialty medication doses or scheduled injections missed or delayed?  No   Does this patient require a clinical escalation to a pharmacist? No            Delivery Questions      Flowsheet Row Most Recent Value   Delivery method UPS   Delivery address verified with patient/caregiver? Yes   Delivery address Home   Number of medications in delivery 1   Medication(s) being filled and delivered Adalimumab (Humira (2 Pen))   Doses left of specialty medications 0   Copay verified? Yes   Copay amount 0   Copay form of payment No copayment ($0)   Delivery Date Selection 05/20/25   Signature Required No                 Follow-up: 21 day(s)     Parris Williamson, Pharmacy Technician  5/19/2025  08:42 EDT

## 2025-06-10 ENCOUNTER — SPECIALTY PHARMACY (OUTPATIENT)
Age: 70
End: 2025-06-10
Payer: MEDICARE

## 2025-06-11 ENCOUNTER — SPECIALTY PHARMACY (OUTPATIENT)
Age: 70
End: 2025-06-11
Payer: MEDICARE

## 2025-06-12 ENCOUNTER — SPECIALTY PHARMACY (OUTPATIENT)
Age: 70
End: 2025-06-12
Payer: MEDICARE

## 2025-06-12 NOTE — PROGRESS NOTES
Specialty Pharmacy Patient Management Program  Refill Outreach     Ashutosh was contacted today regarding refills of their medication(s).    Refill Questions      Flowsheet Row Most Recent Value   Changes to allergies? No   Changes to medications? No   New conditions or infections since last clinic visit No   Unplanned office visit, urgent care, ED, or hospital admission in the last 4 weeks  No   How does patient/caregiver feel medication is working? Poor  [having a lot of stiffness in both hands--right hand is worse--very hard to complete task because of the stiffness in both hands]   Financial problems or insurance changes  No   Since the previous refill, were any specialty medication doses or scheduled injections missed or delayed?  No   Does this patient require a clinical escalation to a pharmacist? No            Delivery Questions      Flowsheet Row Most Recent Value   Delivery method UPS   Delivery address verified with patient/caregiver? Yes   Delivery address Home   Number of medications in delivery 1   Medication(s) being filled and delivered Adalimumab (Humira (2 Pen))   Doses left of specialty medications 1   Copay verified? Yes   Copay amount 0   Copay form of payment No copayment ($0)   Delivery Date Selection 06/13/25   Signature Required No   Do you consent to receive electronic handouts?  No                 Follow-up: 21 day(s)     Lydia Mackenzie, Pharmacy Technician  6/12/2025  11:47 EDT

## 2025-07-07 ENCOUNTER — SPECIALTY PHARMACY (OUTPATIENT)
Age: 70
End: 2025-07-07
Payer: MEDICARE

## 2025-07-07 NOTE — PROGRESS NOTES
Specialty Pharmacy Patient Management Program  Refill Outreach     Ashutosh was contacted today regarding refills of their medication(s).    Refill Questions      Flowsheet Row Most Recent Value   Changes to allergies? No   Changes to medications? No   New conditions or infections since last clinic visit No   Unplanned office visit, urgent care, ED, or hospital admission in the last 4 weeks  No   How does patient/caregiver feel medication is working? Poor  [ is hands is still very poor (and very painful)]   Financial problems or insurance changes  No   Since the previous refill, were any specialty medication doses or scheduled injections missed or delayed?  No   Does this patient require a clinical escalation to a pharmacist? No            Delivery Questions      Flowsheet Row Most Recent Value   Delivery method UPS   Delivery address verified with patient/caregiver? Yes   Delivery address Home   Number of medications in delivery 1   Medication(s) being filled and delivered Adalimumab (Humira (2 Pen))   Doses left of specialty medications 0   Copay verified? Yes   Copay amount 0   Copay form of payment No copayment ($0)   Delivery Date Selection 07/09/25   Signature Required No   Do you consent to receive electronic handouts?  No                 Follow-up: 21 day(s)     Lydia Mackenzie, Pharmacy Technician  7/7/2025  13:28 EDT

## 2025-07-31 ENCOUNTER — SPECIALTY PHARMACY (OUTPATIENT)
Age: 70
End: 2025-07-31
Payer: MEDICARE

## 2025-08-01 ENCOUNTER — SPECIALTY PHARMACY (OUTPATIENT)
Age: 70
End: 2025-08-01
Payer: MEDICARE

## 2025-08-01 ENCOUNTER — TELEPHONE (OUTPATIENT)
Age: 70
End: 2025-08-01
Payer: MEDICARE

## 2025-08-01 NOTE — PROGRESS NOTES
Specialty Pharmacy Patient Management Program  Refill Outreach     Ashutosh was contacted today regarding refills of their medication(s). Humira     Refill Questions      Flowsheet Row Most Recent Value   Changes to allergies? No   Changes to medications? No   New conditions or infections since last clinic visit No   Unplanned office visit, urgent care, ED, or hospital admission in the last 4 weeks  No   How does patient/caregiver feel medication is working? Fair   Financial problems or insurance changes  No   Since the previous refill, were any specialty medication doses or scheduled injections missed or delayed?  No   Does this patient require a clinical escalation to a pharmacist? No            Delivery Questions      Flowsheet Row Most Recent Value   Delivery method UPS   Delivery address verified with patient/caregiver? Yes   Delivery address Home   Number of medications in delivery 1   Medication(s) being filled and delivered Adalimumab (Humira (2 Pen))   Doses left of specialty medications 1   Copay verified? Yes   Copay amount 0   Copay form of payment No copayment ($0)   Delivery Date Selection 08/05/25   Signature Required No   Do you consent to receive electronic handouts?  Yes                 Follow-up: 21 day(s)     Parris Williamson, Pharmacy Technician  8/1/2025  08:49 EDT

## 2025-08-26 ENCOUNTER — SPECIALTY PHARMACY (OUTPATIENT)
Age: 70
End: 2025-08-26
Payer: MEDICARE

## 2025-08-27 ENCOUNTER — SPECIALTY PHARMACY (OUTPATIENT)
Dept: GENERAL RADIOLOGY | Facility: HOSPITAL | Age: 70
End: 2025-08-27
Payer: MEDICARE

## 2025-08-27 RX ORDER — ADALIMUMAB 40MG/0.4ML
1 KIT SUBCUTANEOUS
Qty: 2 EACH | Refills: 5 | Status: SHIPPED | OUTPATIENT
Start: 2025-08-27